# Patient Record
Sex: FEMALE | Race: WHITE | Employment: FULL TIME | URBAN - METROPOLITAN AREA
[De-identification: names, ages, dates, MRNs, and addresses within clinical notes are randomized per-mention and may not be internally consistent; named-entity substitution may affect disease eponyms.]

---

## 2017-07-17 ENCOUNTER — TELEPHONE (OUTPATIENT)
Dept: FAMILY MEDICINE CLINIC | Age: 47
End: 2017-07-17

## 2017-09-08 ENCOUNTER — NURSE ONLY (OUTPATIENT)
Dept: FAMILY MEDICINE CLINIC | Age: 47
End: 2017-09-08

## 2017-09-08 DIAGNOSIS — E55.9 VITAMIN D DEFICIENCY: Primary | ICD-10-CM

## 2017-09-08 DIAGNOSIS — Z00.00 ROUTINE GENERAL MEDICAL EXAMINATION AT A HEALTH CARE FACILITY: ICD-10-CM

## 2017-09-08 LAB
A/G RATIO: 1.9 (ref 1.1–2.2)
ALBUMIN SERPL-MCNC: 4.4 G/DL (ref 3.4–5)
ALP BLD-CCNC: 71 U/L (ref 40–129)
ALT SERPL-CCNC: 25 U/L (ref 10–40)
ANION GAP SERPL CALCULATED.3IONS-SCNC: 11 MMOL/L (ref 3–16)
AST SERPL-CCNC: 32 U/L (ref 15–37)
BILIRUB SERPL-MCNC: 0.4 MG/DL (ref 0–1)
BUN BLDV-MCNC: 21 MG/DL (ref 7–20)
CALCIUM SERPL-MCNC: 9.4 MG/DL (ref 8.3–10.6)
CHLORIDE BLD-SCNC: 105 MMOL/L (ref 99–110)
CHOLESTEROL, TOTAL: 157 MG/DL (ref 0–199)
CO2: 28 MMOL/L (ref 21–32)
CREAT SERPL-MCNC: 0.6 MG/DL (ref 0.6–1.1)
GFR AFRICAN AMERICAN: >60
GFR NON-AFRICAN AMERICAN: >60
GLOBULIN: 2.3 G/DL
GLUCOSE BLD-MCNC: 80 MG/DL (ref 70–99)
HCT VFR BLD CALC: 41.5 % (ref 36–48)
HDLC SERPL-MCNC: 57 MG/DL (ref 40–60)
HEMOGLOBIN: 13.8 G/DL (ref 12–16)
LDL CHOLESTEROL CALCULATED: 90 MG/DL
MCH RBC QN AUTO: 31.6 PG (ref 26–34)
MCHC RBC AUTO-ENTMCNC: 33.2 G/DL (ref 31–36)
MCV RBC AUTO: 95.2 FL (ref 80–100)
PDW BLD-RTO: 13.5 % (ref 12.4–15.4)
PLATELET # BLD: 135 K/UL (ref 135–450)
PMV BLD AUTO: 10.7 FL (ref 5–10.5)
POTASSIUM SERPL-SCNC: 4.7 MMOL/L (ref 3.5–5.1)
RBC # BLD: 4.36 M/UL (ref 4–5.2)
SODIUM BLD-SCNC: 144 MMOL/L (ref 136–145)
TOTAL PROTEIN: 6.7 G/DL (ref 6.4–8.2)
TRIGL SERPL-MCNC: 48 MG/DL (ref 0–150)
TSH SERPL DL<=0.05 MIU/L-ACNC: 0.81 UIU/ML (ref 0.27–4.2)
VITAMIN D 25-HYDROXY: 37.2 NG/ML
VLDLC SERPL CALC-MCNC: 10 MG/DL
WBC # BLD: 2.4 K/UL (ref 4–11)

## 2017-09-08 PROCEDURE — 36415 COLL VENOUS BLD VENIPUNCTURE: CPT | Performed by: FAMILY MEDICINE

## 2017-09-15 ENCOUNTER — OFFICE VISIT (OUTPATIENT)
Dept: FAMILY MEDICINE CLINIC | Age: 47
End: 2017-09-15

## 2017-09-15 VITALS
HEIGHT: 67 IN | BODY MASS INDEX: 18.21 KG/M2 | WEIGHT: 116 LBS | SYSTOLIC BLOOD PRESSURE: 90 MMHG | DIASTOLIC BLOOD PRESSURE: 56 MMHG

## 2017-09-15 DIAGNOSIS — Z00.00 WELL ADULT EXAM: Primary | ICD-10-CM

## 2017-09-15 PROCEDURE — 99396 PREV VISIT EST AGE 40-64: CPT | Performed by: FAMILY MEDICINE

## 2017-09-15 ASSESSMENT — ENCOUNTER SYMPTOMS
GASTROINTESTINAL NEGATIVE: 1
RESPIRATORY NEGATIVE: 1

## 2017-09-15 ASSESSMENT — PATIENT HEALTH QUESTIONNAIRE - PHQ9
SUM OF ALL RESPONSES TO PHQ9 QUESTIONS 1 & 2: 0
1. LITTLE INTEREST OR PLEASURE IN DOING THINGS: 0
2. FEELING DOWN, DEPRESSED OR HOPELESS: 0
SUM OF ALL RESPONSES TO PHQ QUESTIONS 1-9: 0

## 2018-03-16 ENCOUNTER — TELEPHONE (OUTPATIENT)
Dept: FAMILY MEDICINE CLINIC | Age: 48
End: 2018-03-16

## 2018-03-16 DIAGNOSIS — R79.89 ABNORMAL CBC: Primary | ICD-10-CM

## 2018-03-19 ENCOUNTER — NURSE ONLY (OUTPATIENT)
Dept: FAMILY MEDICINE CLINIC | Age: 48
End: 2018-03-19

## 2018-03-19 DIAGNOSIS — R79.89 ABNORMAL CBC: ICD-10-CM

## 2018-03-19 LAB
BASOPHILS ABSOLUTE: 0.1 K/UL (ref 0–0.2)
BASOPHILS RELATIVE PERCENT: 2.6 %
EOSINOPHILS ABSOLUTE: 0.2 K/UL (ref 0–0.6)
EOSINOPHILS RELATIVE PERCENT: 8.5 %
HCT VFR BLD CALC: 41.3 % (ref 36–48)
HEMOGLOBIN: 13.8 G/DL (ref 12–16)
LYMPHOCYTES ABSOLUTE: 0.8 K/UL (ref 1–5.1)
LYMPHOCYTES RELATIVE PERCENT: 33.2 %
MCH RBC QN AUTO: 31.5 PG (ref 26–34)
MCHC RBC AUTO-ENTMCNC: 33.4 G/DL (ref 31–36)
MCV RBC AUTO: 94.4 FL (ref 80–100)
MONOCYTES ABSOLUTE: 0.2 K/UL (ref 0–1.3)
MONOCYTES RELATIVE PERCENT: 8.3 %
NEUTROPHILS ABSOLUTE: 1.1 K/UL (ref 1.7–7.7)
NEUTROPHILS RELATIVE PERCENT: 47.4 %
PDW BLD-RTO: 13.4 % (ref 12.4–15.4)
PLATELET # BLD: 133 K/UL (ref 135–450)
PMV BLD AUTO: 10.4 FL (ref 5–10.5)
RBC # BLD: 4.38 M/UL (ref 4–5.2)
WBC # BLD: 2.3 K/UL (ref 4–11)

## 2018-03-19 PROCEDURE — 36415 COLL VENOUS BLD VENIPUNCTURE: CPT | Performed by: FAMILY MEDICINE

## 2018-03-21 LAB
ALBUMIN SERPL-MCNC: 3.8 G/DL (ref 3.1–4.9)
ALPHA-1-GLOBULIN: 0.2 G/DL (ref 0.2–0.4)
ALPHA-2-GLOBULIN: 0.6 G/DL (ref 0.4–1.1)
BETA GLOBULIN: 1 G/DL (ref 0.9–1.6)
GAMMA GLOBULIN: 1 G/DL (ref 0.6–1.8)
SPE/IFE INTERPRETATION: NORMAL
TOTAL PROTEIN: 6.5 G/DL (ref 6.4–8.2)

## 2018-08-24 ENCOUNTER — TELEPHONE (OUTPATIENT)
Dept: FAMILY MEDICINE CLINIC | Age: 48
End: 2018-08-24

## 2018-09-07 PROCEDURE — 90686 IIV4 VACC NO PRSV 0.5 ML IM: CPT | Performed by: FAMILY MEDICINE

## 2018-09-07 PROCEDURE — 90471 IMMUNIZATION ADMIN: CPT | Performed by: FAMILY MEDICINE

## 2018-09-13 ENCOUNTER — NURSE ONLY (OUTPATIENT)
Dept: FAMILY MEDICINE CLINIC | Age: 48
End: 2018-09-13

## 2018-09-13 DIAGNOSIS — Z00.00 ANNUAL PHYSICAL EXAM: Primary | ICD-10-CM

## 2018-09-13 LAB
A/G RATIO: 2.3 (ref 1.1–2.2)
ALBUMIN SERPL-MCNC: 4.9 G/DL (ref 3.4–5)
ALP BLD-CCNC: 80 U/L (ref 40–129)
ALT SERPL-CCNC: 27 U/L (ref 10–40)
ANION GAP SERPL CALCULATED.3IONS-SCNC: 11 MMOL/L (ref 3–16)
AST SERPL-CCNC: 37 U/L (ref 15–37)
BILIRUB SERPL-MCNC: 0.4 MG/DL (ref 0–1)
BUN BLDV-MCNC: 16 MG/DL (ref 7–20)
CALCIUM SERPL-MCNC: 10 MG/DL (ref 8.3–10.6)
CHLORIDE BLD-SCNC: 106 MMOL/L (ref 99–110)
CHOLESTEROL, TOTAL: 162 MG/DL (ref 0–199)
CO2: 29 MMOL/L (ref 21–32)
CREAT SERPL-MCNC: 0.8 MG/DL (ref 0.6–1.1)
GFR AFRICAN AMERICAN: >60
GFR NON-AFRICAN AMERICAN: >60
GLOBULIN: 2.1 G/DL
GLUCOSE BLD-MCNC: 85 MG/DL (ref 70–99)
HCT VFR BLD CALC: 45.4 % (ref 36–48)
HDLC SERPL-MCNC: 54 MG/DL (ref 40–60)
HEMOGLOBIN: 15 G/DL (ref 12–16)
LDL CHOLESTEROL CALCULATED: 95 MG/DL
MCH RBC QN AUTO: 31 PG (ref 26–34)
MCHC RBC AUTO-ENTMCNC: 33.1 G/DL (ref 31–36)
MCV RBC AUTO: 93.7 FL (ref 80–100)
PDW BLD-RTO: 13.1 % (ref 12.4–15.4)
PLATELET # BLD: 147 K/UL (ref 135–450)
PMV BLD AUTO: 10.3 FL (ref 5–10.5)
POTASSIUM SERPL-SCNC: 5.4 MMOL/L (ref 3.5–5.1)
RBC # BLD: 4.84 M/UL (ref 4–5.2)
SODIUM BLD-SCNC: 146 MMOL/L (ref 136–145)
TOTAL PROTEIN: 7 G/DL (ref 6.4–8.2)
TRIGL SERPL-MCNC: 65 MG/DL (ref 0–150)
TSH REFLEX: 0.87 UIU/ML (ref 0.27–4.2)
VITAMIN D 25-HYDROXY: 37.6 NG/ML
VLDLC SERPL CALC-MCNC: 13 MG/DL
WBC # BLD: 2.6 K/UL (ref 4–11)

## 2018-09-13 PROCEDURE — 36415 COLL VENOUS BLD VENIPUNCTURE: CPT | Performed by: FAMILY MEDICINE

## 2018-09-17 ENCOUNTER — OFFICE VISIT (OUTPATIENT)
Dept: FAMILY MEDICINE CLINIC | Age: 48
End: 2018-09-17

## 2018-09-17 VITALS
HEIGHT: 67 IN | SYSTOLIC BLOOD PRESSURE: 94 MMHG | BODY MASS INDEX: 18.71 KG/M2 | WEIGHT: 119.2 LBS | DIASTOLIC BLOOD PRESSURE: 66 MMHG

## 2018-09-17 DIAGNOSIS — Z00.00 WELL ADULT EXAM: Primary | ICD-10-CM

## 2018-09-17 DIAGNOSIS — Z23 NEED FOR INFLUENZA VACCINATION: ICD-10-CM

## 2018-09-17 PROCEDURE — 99396 PREV VISIT EST AGE 40-64: CPT | Performed by: FAMILY MEDICINE

## 2018-09-17 ASSESSMENT — PATIENT HEALTH QUESTIONNAIRE - PHQ9
SUM OF ALL RESPONSES TO PHQ QUESTIONS 1-9: 0
SUM OF ALL RESPONSES TO PHQ QUESTIONS 1-9: 0
SUM OF ALL RESPONSES TO PHQ9 QUESTIONS 1 & 2: 0
2. FEELING DOWN, DEPRESSED OR HOPELESS: 0
1. LITTLE INTEREST OR PLEASURE IN DOING THINGS: 0

## 2018-09-17 ASSESSMENT — ENCOUNTER SYMPTOMS
RESPIRATORY NEGATIVE: 1
GASTROINTESTINAL NEGATIVE: 1

## 2018-09-17 NOTE — PROGRESS NOTES
normal mood and affect. Her behavior is normal. Judgment and thought content normal.   Nursing note and vitals reviewed. Assessment:   Assessment/plan;  Miranda Miller was seen today for annual exam.    Diagnoses and all orders for this visit:    Well adult exam    Need for influenza vaccination  -     INFLUENZA, QUADV, 3 YRS AND OLDER, IM, PF, PREFILL SYR OR SDV, 0.5ML (Sonja Conn, PF)      Return in about 1 year (around 9/17/2019), or if symptoms worsen or fail to improve.   Discussed health lifestyle   Forms completed     Rhoda Bauman DO

## 2018-11-08 ENCOUNTER — OFFICE VISIT (OUTPATIENT)
Dept: ORTHOPEDIC SURGERY | Age: 48
End: 2018-11-08
Payer: COMMERCIAL

## 2018-11-08 VITALS
BODY MASS INDEX: 18.75 KG/M2 | DIASTOLIC BLOOD PRESSURE: 64 MMHG | HEIGHT: 67 IN | WEIGHT: 119.49 LBS | SYSTOLIC BLOOD PRESSURE: 102 MMHG

## 2018-11-08 DIAGNOSIS — S76.319A HAMSTRING TEAR: Primary | ICD-10-CM

## 2018-11-08 PROCEDURE — 99214 OFFICE O/P EST MOD 30 MIN: CPT | Performed by: ORTHOPAEDIC SURGERY

## 2018-11-08 NOTE — PROGRESS NOTES
Examination  Inspection:  Inspection demonstrates no obvious deformity    Palpation:  She does have some palpable swelling and crepitus over her proximal ischial tuberosity attachment of the hamstring on the left    Rang of Motion:  Good range of motion she does have pain with flexibility of the hamstring tendon    Strength:  She has pain with strength testing of the hamstring but overall her lower extremity strength is excellent    Special Tests:  Pedal pulses are +2 over 4 capillary refill is brisk sensation is intact negative Homans negative Uday negative Wood's test she does have pain to palpation at the initial tuberosity she does a pain to palpation along the proximal hamstring tendon she has good strength I can palpate the tendon all the way to the initial tuberosity    Skin: There are no rashes, ulcerations or lesions. Gait: Normal gait  +2/4 and equal bilaterally for patella and Achilles      Additional Comments:     Additional Examinations:  Right Lower Extremity: Examination of the right lower extremity does not show any tenderness, deformity or injury. Range of motion is unremarkable. There is no gross instability. There are no rashes, ulcerations or lesions. Strength and tone are normal.  Thoracic Spine: Examination of the thoracic spine does not show any tenderness, deformity or injury. Range of motion is unremarkable. There is no gross instability. There are no rashes, ulcerations or lesions. Strength and tone are normal.  Lower Back: Examination of the lower back does not show any tenderness, deformity or injury. Range of motion is unremarkable. There is no gross instability. There are no rashes, ulcerations or lesions.   Strength and tone are normal.      Diagnostic Testing:    Views MRI multiple views  and I independently reviewed these films today in my office,   Body Part left hamstring Impression proximal hamstring partial tear more than 60% of the tendon is torn

## 2018-11-12 ENCOUNTER — OFFICE VISIT (OUTPATIENT)
Dept: ORTHOPEDIC SURGERY | Age: 48
End: 2018-11-12
Payer: COMMERCIAL

## 2018-11-12 VITALS — BODY MASS INDEX: 18.68 KG/M2 | HEIGHT: 67 IN | WEIGHT: 119 LBS

## 2018-11-12 DIAGNOSIS — S76.312A LEFT PROXIMAL HAMSTRING TENDON RUPTURE, INITIAL ENCOUNTER: ICD-10-CM

## 2018-11-12 DIAGNOSIS — S76.319A PARTIAL HAMSTRING TEAR, INITIAL ENCOUNTER: Primary | ICD-10-CM

## 2018-11-12 PROCEDURE — 99243 OFF/OP CNSLTJ NEW/EST LOW 30: CPT | Performed by: INTERNAL MEDICINE

## 2018-11-12 NOTE — PROGRESS NOTES
Chief Complaint:   Chief Complaint   Patient presents with    Leg Pain     OPSP L HAMSTRING PAIN          History of Present Illness:       Patient is a 53 y.o. female presents with the above complaint. The symptoms began 6 monthsago and started without an injury. She is seen in consultation at the request of Dr. Renetta Molina for consideration biologic orthopedic injection recently diagnosed with partial tear/high-grade tear of common hamstring origin. The patient describes a sharp pain that does radiate. The symptoms are constant  and are show no change since the onset. Despite a trial of aggressive physical therapy inclusive of dry needling treatments there's been no appreciable change and with a more aggressive treatment of dry needling treatment she noted worsening pattern of pain she rates the pain is 9/10 in severity at worst.  There was no history of acute injury to either left or right hamstring. She is a recreational runner    She has a component of pain at rest in a seated position primarily but she can tolerate walking activity at a short puneet essentially pain-free. She would like to return to recreational running with her dog. Her workup to date has included MRI scan suggesting 90% disruption of the left and less so right hamstring tendon origins estimated up to 90% of tendon disruption relative to MRI outside institution October 15, 2018. MRI is not available for direct review at the time of this dictation.   She denies any low back pain she denies any neuritic character symptoms she is otherwise healthy and has no complaints she denies any new onset or progressive weakness of lower extremity or dynamic weakness of the lower extremity    She is not interested in seeking a surgical option for treatment at this time      She localizes pain to the skilled tuberosity region she denies any mechanical symptoms       Past Medical History:        Past Medical History:   Diagnosis Date   

## 2018-11-15 ENCOUNTER — TELEPHONE (OUTPATIENT)
Dept: ORTHOPEDIC SURGERY | Age: 48
End: 2018-11-15

## 2018-11-29 ENCOUNTER — OFFICE VISIT (OUTPATIENT)
Dept: ORTHOPEDIC SURGERY | Age: 48
End: 2018-11-29
Payer: COMMERCIAL

## 2018-11-29 VITALS — WEIGHT: 119 LBS | HEIGHT: 67 IN | BODY MASS INDEX: 18.68 KG/M2

## 2018-11-29 DIAGNOSIS — S76.312A LEFT PROXIMAL HAMSTRING TENDON RUPTURE, INITIAL ENCOUNTER: ICD-10-CM

## 2018-11-29 DIAGNOSIS — S76.319A PARTIAL HAMSTRING TEAR, INITIAL ENCOUNTER: Primary | ICD-10-CM

## 2018-11-29 PROCEDURE — PRPINJ PRP INJECTION: Performed by: INTERNAL MEDICINE

## 2018-11-29 PROCEDURE — 99999 PR OFFICE/OUTPT VISIT,PROCEDURE ONLY: CPT | Performed by: INTERNAL MEDICINE

## 2018-11-29 PROCEDURE — E0114 CRUTCH UNDERARM PAIR NO WOOD: HCPCS | Performed by: INTERNAL MEDICINE

## 2018-11-29 RX ORDER — CYCLOBENZAPRINE HCL 10 MG
10 TABLET ORAL NIGHTLY PRN
Qty: 10 TABLET | Refills: 1 | Status: SHIPPED | OUTPATIENT
Start: 2018-11-29 | End: 2018-12-09

## 2018-11-29 RX ORDER — TRAMADOL HYDROCHLORIDE 50 MG/1
50 TABLET ORAL EVERY 6 HOURS PRN
Qty: 20 TABLET | Refills: 0 | Status: SHIPPED | OUTPATIENT
Start: 2018-11-29 | End: 2018-12-09

## 2018-12-06 ENCOUNTER — OFFICE VISIT (OUTPATIENT)
Dept: ORTHOPEDIC SURGERY | Age: 48
End: 2018-12-06
Payer: COMMERCIAL

## 2018-12-06 VITALS — BODY MASS INDEX: 18.68 KG/M2 | HEIGHT: 67 IN | WEIGHT: 119 LBS

## 2018-12-06 DIAGNOSIS — S76.312D LEFT PROXIMAL HAMSTRING TENDON RUPTURE, SUBSEQUENT ENCOUNTER: ICD-10-CM

## 2018-12-06 DIAGNOSIS — S76.319D: Primary | ICD-10-CM

## 2018-12-06 PROCEDURE — 99213 OFFICE O/P EST LOW 20 MIN: CPT | Performed by: INTERNAL MEDICINE

## 2018-12-07 PROBLEM — S76.312D: Status: ACTIVE | Noted: 2018-11-12

## 2018-12-07 NOTE — PROGRESS NOTES
Amoxicillin     Penicillins            Review of Systems:    Pertinent items are noted in HPI       Vital Signs: There were no vitals filed for this visit. General Exam:     Constitutional: Patient is adequately groomed with no evidence of malnutrition    Physical Exam: left hip/thigh       Primary Exam:    Inspection:  No deformity atrophy or appreciable effusion      Palpation:  No focal area of tenderness      Range of Motion:  90/90-modified passive hamstring stretch without pain      Strength:  Submaximal resisted knee flexion without pain      Special Test: negative SLR      Skin: There are no rashes, ulcerations or lesions. GaitNear normal    Neurovascular - non focal and intact       Additional Comments:        Additional Examinations:                    Office Imaging Results/Procedures PerformedToday:          Office Procedures:     Orders Placed This Encounter   Procedures    Amb External Referral To Physical Therapy     Referral Priority:   Routine     Referral Type:   Consult for Advice and Opinion     Requested Specialty:   Physical Therapy     Number of Visits Requested:   1           Other Outside Imaging and Testing Personally Reviewed:       none          Assessment   Impression: . Encounter Diagnoses   Name Primary?     Partial hamstring tear, subsequent encounter Yes    Left proximal hamstring tendon rupture, subsequent encounter               Plan:     She can transition from crutch to when necessary use  Active rest avoidance of impact activities/dedicated recreational walking activity  She can start in modified hamstring rehabilitation program specifics documented in the medical record  WOMAC on follow up       Orders:        Orders Placed This Encounter   Procedures    Amb External Referral To Physical Therapy     Referral Priority:   Routine     Referral Type:   Consult for Advice and Opinion     Requested Specialty:   Physical Therapy     Number of Visits Requested:   1

## 2019-01-03 ENCOUNTER — OFFICE VISIT (OUTPATIENT)
Dept: ORTHOPEDIC SURGERY | Age: 49
End: 2019-01-03
Payer: COMMERCIAL

## 2019-01-03 VITALS — WEIGHT: 119 LBS | HEIGHT: 67 IN | BODY MASS INDEX: 18.68 KG/M2

## 2019-01-03 DIAGNOSIS — S76.312D LEFT PROXIMAL HAMSTRING TENDON RUPTURE, SUBSEQUENT ENCOUNTER: Primary | ICD-10-CM

## 2019-01-03 DIAGNOSIS — S76.319D: ICD-10-CM

## 2019-01-03 PROCEDURE — 99213 OFFICE O/P EST LOW 20 MIN: CPT | Performed by: INTERNAL MEDICINE

## 2019-03-07 ENCOUNTER — OFFICE VISIT (OUTPATIENT)
Dept: ORTHOPEDIC SURGERY | Age: 49
End: 2019-03-07
Payer: COMMERCIAL

## 2019-03-07 DIAGNOSIS — S76.319D: ICD-10-CM

## 2019-03-07 DIAGNOSIS — S76.312D LEFT PROXIMAL HAMSTRING TENDON RUPTURE, SUBSEQUENT ENCOUNTER: Primary | ICD-10-CM

## 2019-03-07 PROCEDURE — 99213 OFFICE O/P EST LOW 20 MIN: CPT | Performed by: INTERNAL MEDICINE

## 2019-06-06 ENCOUNTER — OFFICE VISIT (OUTPATIENT)
Dept: ORTHOPEDIC SURGERY | Age: 49
End: 2019-06-06
Payer: COMMERCIAL

## 2019-06-06 DIAGNOSIS — S76.312D LEFT PROXIMAL HAMSTRING TENDON RUPTURE, SUBSEQUENT ENCOUNTER: Primary | ICD-10-CM

## 2019-06-06 PROCEDURE — 99213 OFFICE O/P EST LOW 20 MIN: CPT | Performed by: INTERNAL MEDICINE

## 2019-06-06 NOTE — LETTER
[facility]  [place] [address] [address]  Phone: [phone]  Fax: [phone]    Rudolph Dancer, MD        June 6, 2019     Patient: Socorro Truong   YOB: 1970   Date of Visit: 6/6/2019       To Whom it May Concern:    Socorro Truong was seen in my clinic on 6/6/2019. If you have any questions or concerns, please don't hesitate to call.     Sincerely,         Rudolph Dancer, MD

## 2019-06-12 NOTE — PROGRESS NOTES
Chief Complaint:   Chief Complaint   Patient presents with    Leg Pain     F/U L HAMSTRING PAIN          History of Present Illness:       Patient is a 53 y.o. female returns follow up for the above complaint. The patient was last seen approximately 3 monthsago. The symptoms are improving since the last visit. The patient has had no further testing for the problem. She is increasingly more functional and has returned to running up to 4.5 miles per session and she is tolerating this without escalating pain levels she is pleased with the improvement and has transition from physical therapy    She is now 6 months out from type I PRP injection ultrasound-guided proximal hamstring origin    WOMAC 1    She denies any neuritic symptoms involving the lower extremities she denies any new onset progressive weakness of the lower extremities    No mechanical symptoms localizing to the ischial tuberosity region       Past Medical History:        Past Medical History:   Diagnosis Date    Allergic     Staph skin infection Oct 2012    knee        Present Medications:         Current Outpatient Medications   Medication Sig Dispense Refill    Magnesium Hydroxide (MAGNESIA PO) Take by mouth      Misc Natural Products (TART CHERRY ADVANCED PO) Take 1,000 mg by mouth daily      GLUCOSAMINE HCL-MSM PO Take by mouth      TURMERIC CURCUMIN PO Take by mouth      Cholecalciferol (VITAMIN D3) 400 UNIT/ML LIQD Take by mouth      IRON, FERROUS GLUCONATE, PO Take by mouth       Current Facility-Administered Medications   Medication Dose Route Frequency Provider Last Rate Last Dose    methylPREDNISolone acetate (DEPO-MEDROL) injection 80 mg  80 mg Intra-articular Once Navneet Bradley DO             Allergies: Allergies   Allergen Reactions    Amoxicillin     Penicillins            Review of Systems:    Pertinent items are noted in HPI        Vital Signs: There were no vitals filed for this visit.      General Exam: Constitutional: Patient is adequately groomed with no evidence of malnutrition    Physical Exam: left hip/thigh      Primary Exam:    Inspection: No deformity atrophy appreciable defect      Palpation: No focal tenderness      Range of Motion: 90/90 position excellent hamstring flexibility no pain with passive hip flexion      Strength: Submaximal resisted knee flexion without pain      Special Tests: Negative SLR      Skin: There are no rashes, ulcerations or lesions. Gait: Nonantalgic      Neurovascular - non focal and intact       Additional Comments:        Additional Examinations:                   Office Imaging Results/Procedures PerformedToday:     Limited ultrasound evaluation left hip/thigh  Left proximal hamstring  Logically ultrasound 10 MHz    The linear transducer was utilized and image optimization was obtained. The proximal hamstring tendon was evaluated extensively in long axis using linear transducer. Again noted was the full-thickness partial tear of the common hamstring origin with intact medial fibers clearly originating from the ischial tuberosity. Overall there is suggestion of increase in echogenicity consistent with reparative change in the intact distribution of the proximal hamstring tendon but overall increased caliber of the tendon consistent with tendinopathy/strain. There was a anechoic fluid signal within the defect resulting from the torn portion and a thin anechoic fluid signal overlying the intact portion of the tendon. Dynamic stressing there was no evidence of gapping of fibers to suggest high-grade tear in the portion that remained intact.     No soft tissue lesions or cystic lesions in the anatomic area that was evaluated    Power Doppler imaging negative     Office Procedures:     Orders Placed This Encounter   Procedures    US EXTREMITY RIGHT NON VASC LIMITED           Other Outside Imaging and Testing Personally Reviewed:    Us Extremity Right Non Vasc

## 2019-09-11 ENCOUNTER — NURSE ONLY (OUTPATIENT)
Dept: FAMILY MEDICINE CLINIC | Age: 49
End: 2019-09-11
Payer: COMMERCIAL

## 2019-09-11 ENCOUNTER — TELEPHONE (OUTPATIENT)
Dept: FAMILY MEDICINE CLINIC | Age: 49
End: 2019-09-11

## 2019-09-11 DIAGNOSIS — Z00.00 WELL ADULT EXAM: Primary | ICD-10-CM

## 2019-09-11 LAB
A/G RATIO: 2.6 (ref 1.1–2.2)
ALBUMIN SERPL-MCNC: 4.6 G/DL (ref 3.4–5)
ALP BLD-CCNC: 60 U/L (ref 40–129)
ALT SERPL-CCNC: 33 U/L (ref 10–40)
ANION GAP SERPL CALCULATED.3IONS-SCNC: 10 MMOL/L (ref 3–16)
AST SERPL-CCNC: 44 U/L (ref 15–37)
BASOPHILS ABSOLUTE: 0 K/UL (ref 0–0.2)
BASOPHILS RELATIVE PERCENT: 1 %
BILIRUB SERPL-MCNC: 0.4 MG/DL (ref 0–1)
BUN BLDV-MCNC: 19 MG/DL (ref 7–20)
CALCIUM SERPL-MCNC: 9.5 MG/DL (ref 8.3–10.6)
CHLORIDE BLD-SCNC: 106 MMOL/L (ref 99–110)
CHOLESTEROL, TOTAL: 153 MG/DL (ref 0–199)
CO2: 27 MMOL/L (ref 21–32)
CREAT SERPL-MCNC: 0.8 MG/DL (ref 0.6–1.1)
CRENATED RBC'S: ABNORMAL
EOSINOPHILS ABSOLUTE: 0.1 K/UL (ref 0–0.6)
EOSINOPHILS RELATIVE PERCENT: 3 %
GFR AFRICAN AMERICAN: >60
GFR NON-AFRICAN AMERICAN: >60
GLOBULIN: 1.8 G/DL
GLUCOSE BLD-MCNC: 90 MG/DL (ref 70–99)
HCT VFR BLD CALC: 41 % (ref 36–48)
HDLC SERPL-MCNC: 58 MG/DL (ref 40–60)
HEMATOLOGY PATH CONSULT: YES
HEMOGLOBIN: 13.8 G/DL (ref 12–16)
LDL CHOLESTEROL CALCULATED: 83 MG/DL
LYMPHOCYTES ABSOLUTE: 0.7 K/UL (ref 1–5.1)
LYMPHOCYTES RELATIVE PERCENT: 36 %
MCH RBC QN AUTO: 32.1 PG (ref 26–34)
MCHC RBC AUTO-ENTMCNC: 33.5 G/DL (ref 31–36)
MCV RBC AUTO: 95.8 FL (ref 80–100)
MONOCYTES ABSOLUTE: 0.1 K/UL (ref 0–1.3)
MONOCYTES RELATIVE PERCENT: 6 %
NEUTROPHILS ABSOLUTE: 1 K/UL (ref 1.7–7.7)
NEUTROPHILS RELATIVE PERCENT: 54 %
PDW BLD-RTO: 13.9 % (ref 12.4–15.4)
PLATELET # BLD: 129 K/UL (ref 135–450)
PLATELET SLIDE REVIEW: ABNORMAL
PMV BLD AUTO: 10.2 FL (ref 5–10.5)
POIKILOCYTES: ABNORMAL
POTASSIUM SERPL-SCNC: 5 MMOL/L (ref 3.5–5.1)
RBC # BLD: 4.28 M/UL (ref 4–5.2)
SLIDE REVIEW: ABNORMAL
SODIUM BLD-SCNC: 143 MMOL/L (ref 136–145)
TOTAL PROTEIN: 6.4 G/DL (ref 6.4–8.2)
TRIGL SERPL-MCNC: 62 MG/DL (ref 0–150)
TSH SERPL DL<=0.05 MIU/L-ACNC: 1.17 UIU/ML (ref 0.27–4.2)
VITAMIN D 25-HYDROXY: 30.1 NG/ML
VLDLC SERPL CALC-MCNC: 12 MG/DL
WBC # BLD: 1.9 K/UL (ref 4–11)

## 2019-09-11 PROCEDURE — 36415 COLL VENOUS BLD VENIPUNCTURE: CPT | Performed by: FAMILY MEDICINE

## 2019-09-12 LAB — HEMATOLOGY PATH CONSULT: NORMAL

## 2019-09-18 ENCOUNTER — HOSPITAL ENCOUNTER (OUTPATIENT)
Dept: ULTRASOUND IMAGING | Age: 49
Discharge: HOME | End: 2019-09-18
Payer: COMMERCIAL

## 2019-09-18 ENCOUNTER — OFFICE VISIT (OUTPATIENT)
Dept: FAMILY MEDICINE CLINIC | Age: 49
End: 2019-09-18
Payer: COMMERCIAL

## 2019-09-18 VITALS
HEIGHT: 67 IN | DIASTOLIC BLOOD PRESSURE: 64 MMHG | SYSTOLIC BLOOD PRESSURE: 118 MMHG | WEIGHT: 118 LBS | BODY MASS INDEX: 18.52 KG/M2

## 2019-09-18 DIAGNOSIS — Z00.00 WELL ADULT EXAM: Primary | ICD-10-CM

## 2019-09-18 DIAGNOSIS — D72.819 LEUKOPENIA, UNSPECIFIED TYPE: ICD-10-CM

## 2019-09-18 PROCEDURE — 76705 ECHO EXAM OF ABDOMEN: CPT

## 2019-09-18 PROCEDURE — 99396 PREV VISIT EST AGE 40-64: CPT | Performed by: FAMILY MEDICINE

## 2019-09-18 ASSESSMENT — PATIENT HEALTH QUESTIONNAIRE - PHQ9
SUM OF ALL RESPONSES TO PHQ QUESTIONS 1-9: 0
SUM OF ALL RESPONSES TO PHQ9 QUESTIONS 1 & 2: 0
2. FEELING DOWN, DEPRESSED OR HOPELESS: 0
1. LITTLE INTEREST OR PLEASURE IN DOING THINGS: 0
SUM OF ALL RESPONSES TO PHQ QUESTIONS 1-9: 0

## 2019-12-17 ENCOUNTER — OFFICE VISIT (OUTPATIENT)
Dept: ORTHOPEDIC SURGERY | Age: 49
End: 2019-12-17
Payer: COMMERCIAL

## 2019-12-17 VITALS — RESPIRATION RATE: 17 BRPM | HEIGHT: 67 IN | BODY MASS INDEX: 18.51 KG/M2 | WEIGHT: 117.95 LBS

## 2019-12-17 DIAGNOSIS — S76.319D: ICD-10-CM

## 2019-12-17 DIAGNOSIS — S76.312D LEFT PROXIMAL HAMSTRING TENDON RUPTURE, SUBSEQUENT ENCOUNTER: Primary | ICD-10-CM

## 2019-12-17 PROCEDURE — 99213 OFFICE O/P EST LOW 20 MIN: CPT | Performed by: INTERNAL MEDICINE

## 2020-08-10 ENCOUNTER — TELEPHONE (OUTPATIENT)
Dept: GYNECOLOGY | Age: 50
End: 2020-08-10

## 2020-08-10 NOTE — TELEPHONE ENCOUNTER
Patient called to schedule Wellness visit for 9/18, she would like to have blood work done, I told her to go to [facility] to have Blood work done before hand for her Wellness Visit, so please put order in so she may do this  Before her visit, thanks

## 2020-09-11 DIAGNOSIS — Z00.00 WELL ADULT EXAM: ICD-10-CM

## 2020-09-11 LAB
A/G RATIO: 2.3 (ref 1.1–2.2)
ALBUMIN SERPL-MCNC: 4.5 G/DL (ref 3.4–5)
ALP BLD-CCNC: 59 U/L (ref 40–129)
ALT SERPL-CCNC: 24 U/L (ref 10–40)
ANION GAP SERPL CALCULATED.3IONS-SCNC: 11 MMOL/L (ref 3–16)
AST SERPL-CCNC: 33 U/L (ref 15–37)
BILIRUB SERPL-MCNC: 0.4 MG/DL (ref 0–1)
BUN BLDV-MCNC: 21 MG/DL (ref 7–20)
CALCIUM SERPL-MCNC: 9.7 MG/DL (ref 8.3–10.6)
CHLORIDE BLD-SCNC: 107 MMOL/L (ref 99–110)
CHOLESTEROL, TOTAL: 194 MG/DL (ref 0–199)
CO2: 25 MMOL/L (ref 21–32)
CREAT SERPL-MCNC: 0.6 MG/DL (ref 0.6–1.1)
GFR AFRICAN AMERICAN: >60
GFR NON-AFRICAN AMERICAN: >60
GLOBULIN: 2 G/DL
GLUCOSE BLD-MCNC: 88 MG/DL (ref 70–99)
HCT VFR BLD CALC: 42.7 % (ref 36–48)
HDLC SERPL-MCNC: 58 MG/DL (ref 40–60)
HEMOGLOBIN: 14.4 G/DL (ref 12–16)
LDL CHOLESTEROL CALCULATED: 123 MG/DL
MCH RBC QN AUTO: 31.7 PG (ref 26–34)
MCHC RBC AUTO-ENTMCNC: 33.7 G/DL (ref 31–36)
MCV RBC AUTO: 94 FL (ref 80–100)
PDW BLD-RTO: 12.8 % (ref 12.4–15.4)
PLATELET # BLD: 130 K/UL (ref 135–450)
PMV BLD AUTO: 9.9 FL (ref 5–10.5)
POTASSIUM SERPL-SCNC: 4.1 MMOL/L (ref 3.5–5.1)
RBC # BLD: 4.54 M/UL (ref 4–5.2)
SODIUM BLD-SCNC: 143 MMOL/L (ref 136–145)
TOTAL PROTEIN: 6.5 G/DL (ref 6.4–8.2)
TRIGL SERPL-MCNC: 63 MG/DL (ref 0–150)
TSH SERPL DL<=0.05 MIU/L-ACNC: 1.15 UIU/ML (ref 0.27–4.2)
VLDLC SERPL CALC-MCNC: 13 MG/DL
WBC # BLD: 2.6 K/UL (ref 4–11)

## 2020-09-18 ENCOUNTER — OFFICE VISIT (OUTPATIENT)
Dept: FAMILY MEDICINE CLINIC | Age: 50
End: 2020-09-18
Payer: COMMERCIAL

## 2020-09-18 VITALS
BODY MASS INDEX: 18.36 KG/M2 | HEIGHT: 67 IN | DIASTOLIC BLOOD PRESSURE: 62 MMHG | SYSTOLIC BLOOD PRESSURE: 90 MMHG | TEMPERATURE: 97.8 F | WEIGHT: 117 LBS

## 2020-09-18 PROCEDURE — 99396 PREV VISIT EST AGE 40-64: CPT | Performed by: FAMILY MEDICINE

## 2020-09-18 RX ORDER — ACETAMINOPHEN 160 MG
2 TABLET,DISINTEGRATING ORAL DAILY
COMMUNITY

## 2020-09-18 ASSESSMENT — PATIENT HEALTH QUESTIONNAIRE - PHQ9
1. LITTLE INTEREST OR PLEASURE IN DOING THINGS: 0
SUM OF ALL RESPONSES TO PHQ QUESTIONS 1-9: 0
SUM OF ALL RESPONSES TO PHQ QUESTIONS 1-9: 0
2. FEELING DOWN, DEPRESSED OR HOPELESS: 0
SUM OF ALL RESPONSES TO PHQ9 QUESTIONS 1 & 2: 0

## 2020-09-18 NOTE — PROGRESS NOTES
Subjective:      Patient ID: Altaf lEliott is a 51 y.o. female. HPI  Well exam  She is doing fine  No concerns  She had blood work  She wants to review   Review of Systems   Constitutional: Negative. Respiratory: Negative. Cardiovascular: Negative. Gastrointestinal: Negative. Skin: Negative. Neurological: Negative. YOB: 1970    Date of Visit:  9/18/2020    Allergies   Allergen Reactions    Amoxicillin     Penicillins        Outpatient Medications Marked as Taking for the 9/18/20 encounter (Office Visit) with Lele Kerns DO   Medication Sig Dispense Refill    Cholecalciferol (VITAMIN D3) 50 MCG (2000 UT) CAPS Take by mouth      Magnesium Hydroxide (MAGNESIA PO) Take by mouth      IRON, FERROUS GLUCONATE, PO Take by mouth         Vitals:    09/18/20 0853   BP: 90/62   Temp: 97.8 °F (36.6 °C)   Weight: 117 lb (53.1 kg)   Height: 5' 7\" (1.702 m)     Body mass index is 18.32 kg/m².      Wt Readings from Last 3 Encounters:   09/18/20 117 lb (53.1 kg)   12/17/19 117 lb 15.1 oz (53.5 kg)   09/18/19 118 lb (53.5 kg)     BP Readings from Last 3 Encounters:   09/18/20 90/62   09/18/19 118/64   11/08/18 102/64     Allergies   Allergen Reactions    Amoxicillin     Penicillins      Current Outpatient Medications   Medication Sig Dispense Refill    Cholecalciferol (VITAMIN D3) 50 MCG (2000 UT) CAPS Take by mouth      Magnesium Hydroxide (MAGNESIA PO) Take by mouth      IRON, FERROUS GLUCONATE, PO Take by mouth       Current Facility-Administered Medications   Medication Dose Route Frequency Provider Last Rate Last Dose    methylPREDNISolone acetate (DEPO-MEDROL) injection 80 mg  80 mg Intra-articular Once Lele Kerns DO         Past Medical History:   Diagnosis Date    Allergic     Neuroma     Staph skin infection Oct 2012    knee     Past Surgical History:   Procedure Laterality Date    FINGER SURGERY  2012    remove infection    FOOT SURGERY      seasmoidectomy Social History     Tobacco Use    Smoking status: Never Smoker    Smokeless tobacco: Never Used    Tobacco comment: encouraged to never smoke    Substance Use Topics    Alcohol use: Yes     Alcohol/week: 0.0 standard drinks     Comment: 1-2 per week    Drug use: No     Family History   Problem Relation Age of Onset    Parkinsonism Mother     High Cholesterol Mother     Glaucoma Father     High Cholesterol Father     Glaucoma Brother         four brothers with glaucoma          Objective:   Physical Exam  Vitals signs and nursing note reviewed. Constitutional:       General: She is not in acute distress. Appearance: She is well-developed. HENT:      Head: Normocephalic. Right Ear: External ear normal.      Left Ear: External ear normal.      Nose: Nose normal.   Eyes:      General:         Left eye: No discharge. Conjunctiva/sclera: Conjunctivae normal.      Pupils: Pupils are equal, round, and reactive to light. Neck:      Musculoskeletal: Normal range of motion. Thyroid: No thyromegaly. Cardiovascular:      Rate and Rhythm: Normal rate and regular rhythm. Heart sounds: Normal heart sounds. No murmur. Pulmonary:      Effort: Pulmonary effort is normal. No respiratory distress. Breath sounds: No wheezing or rales. Abdominal:      General: There is no distension. Palpations: Abdomen is soft. There is no mass. Tenderness: There is no guarding. Lymphadenopathy:      Cervical: No cervical adenopathy. Skin:     General: Skin is warm and dry. Findings: No erythema or rash. Neurological:      Mental Status: She is alert and oriented to person, place, and time. Deep Tendon Reflexes: Reflexes are normal and symmetric. Psychiatric:         Behavior: Behavior normal.         Thought Content:  Thought content normal.         Judgment: Judgment normal.         Assessment:     Assessment/plan;  Va Ballesteros was seen today for annual exam and results.     Diagnoses and all orders for this visit:    Well adult exam      Discussed lab     Health maintenance and safety discussed     Pema Garcia DO

## 2020-09-19 ASSESSMENT — ENCOUNTER SYMPTOMS
GASTROINTESTINAL NEGATIVE: 1
RESPIRATORY NEGATIVE: 1

## 2021-06-08 ENCOUNTER — OFFICE VISIT (OUTPATIENT)
Dept: ORTHOPEDIC SURGERY | Age: 51
End: 2021-06-08
Payer: COMMERCIAL

## 2021-06-08 VITALS — HEIGHT: 67 IN | BODY MASS INDEX: 18.37 KG/M2 | WEIGHT: 117.06 LBS

## 2021-06-08 DIAGNOSIS — S46.212A RUPTURE OF LEFT DISTAL BICEPS TENDON, INITIAL ENCOUNTER: ICD-10-CM

## 2021-06-08 DIAGNOSIS — M25.522 LEFT ELBOW PAIN: Primary | ICD-10-CM

## 2021-06-08 DIAGNOSIS — S46.212A TRAUMATIC PARTIAL TEAR OF LEFT BICEPS TENDON, INITIAL ENCOUNTER: ICD-10-CM

## 2021-06-08 PROCEDURE — 99214 OFFICE O/P EST MOD 30 MIN: CPT | Performed by: INTERNAL MEDICINE

## 2021-06-08 RX ORDER — MELOXICAM 15 MG/1
15 TABLET ORAL DAILY
Qty: 30 TABLET | Refills: 1 | OUTPATIENT
Start: 2021-06-08 | End: 2021-08-29

## 2021-06-08 NOTE — PROGRESS NOTES
Chief Complaint:   Chief Complaint   Patient presents with    Elbow Pain     left, onset 5/18/21, NKI, felt pain during workout, \"overuse\" injury, pain did not go away even with changing/eliminating workouts          History of Present Illness:       Patient is a 53 y.o. female presents with the above complaint. The symptoms began 3 weeksago and started without an injury. The patient describes a sharp pain that does not radiate. The symptoms are constant  and are show no change since the onset. Pain localizes to the cubital fossa region of the elbow and  does not seem to follow a typical epicondylitis provacative pattern. There are not associated mechanical symptoms. There is not neuritic symptoms about the elbow. The patient denies subjective instability about the elbow and denies new onset or progressive weakness of the upper extremity. Pain level 6    The patient denies a pattern of activity related swelling. Treatment to date: Activity modifications    There is nono prior history of elbow trauma. There is no prior history of autoimmune disease, crystal arthropathy, or crystal arthropathy.           Past Medical History:        Past Medical History:   Diagnosis Date    Allergic     Neuroma     Staph skin infection Oct 2012    knee         Past Surgical History:   Procedure Laterality Date    FINGER SURGERY  2012    remove infection    FOOT SURGERY      seasmoidectomy         Present Medications:         Current Outpatient Medications   Medication Sig Dispense Refill    meloxicam (MOBIC) 15 MG tablet Take 1 tablet by mouth daily 30 tablet 1    Cholecalciferol (VITAMIN D3) 50 MCG (2000 UT) CAPS Take by mouth      Magnesium Hydroxide (MAGNESIA PO) Take by mouth      IRON, FERROUS GLUCONATE, PO Take by mouth       Current Facility-Administered Medications   Medication Dose Route Frequency Provider Last Rate Last Admin    methylPREDNISolone acetate (DEPO-MEDROL) injection 80 mg  80 mg visit. General Exam:     Constitutional: Patient is adequately groomed with no evidence of malnutrition  Mental Status: The patient is oriented to time, place and person. The patient's mood and affect are appropriate. Vascular: Examination reveals no swelling or calf tenderness. Peripheral pulses are palpable and 2+. Lymphatics: no lymphadenopathy of the inguinal region or lower extremity      Physical Exam: left elbow   General: Thin body habitus     Primary Exam:    Inspection: No deformity atrophy appreciable effusion      Palpation: There is tenderness of the distal biceps tendon at the level of the radial styloid      Range of Motion: Full range symmetric      Strength: Normal resisted pain with supination reproducing her pain      Special Tests: Negative hook test      Skin: There are no rashes, ulcerations or lesions. Gait: Nonantalgic      Reflex intact upper     Additional Comments:        Additional Examinations:           Right Upper Extremity:  Examination of the right upper extremity does not show any tenderness, deformity or injury. Range of motion is unremarkable. There is no gross instability. There are no rashes, ulcerations or lesions. Strength and tone are normal.   Neurologic -Light touch sensation and manual muscle testing is normal C5-C8 . Biceps and triceps reflexes are symmetric and +2.  Spurlling sign is negative         Office Imaging Results/Procedures PerformedToday:      Radiology:      X-rays obtained and reviewed in office:   Views 3 views   Location left elbow   Impression radial humeral ulnohumeral joints have normal radiographic appearance lateral projection reveals radiocapitellar joint is congruent       Office Procedures:     Orders Placed This Encounter   Procedures    XR ELBOW LEFT (MIN 3 VIEWS)     Standing Status:   Future     Number of Occurrences:   1     Standing Expiration Date:   6/8/2022     Order Specific Question:   Reason for exam:     Answer: pain    MRI ELBOW LEFT WO CONTRAST     [facility], please call pt to schedule, Kisha [address] will obtain auth and fwd to your facility  Pt advised to f/u in clinic 2-3 days after MRI for results     Standing Status:   Future     Standing Expiration Date:   6/8/2022     Order Specific Question:   Reason for exam:     Answer:   r/o biceps tendon partial insertion tear           Other Outside Imaging and Testing Personally Reviewed:    No results found. Assessment   Impression: . Encounter Diagnoses   Name Primary?  Rupture of left distal biceps tendon, initial encounter     Traumatic partial tear of left biceps tendon, initial encounter     Left elbow pain Yes        Biceps tendon insertion      Plan: Activity modification-distal biceps tendon protocol  Gentle stretching program demonstrated in the office  MRI evaluation evaluate for partial tear and/or high-grade tendinopathy distal biceps tendon         The nature of the finding, probable diagnosis and likely treatment was thoroughly discussed with the patient. The options, risks, complications, alternative treatment as well as some of the differential diagnosis was discussed. The patient was thoroughly informed and all questions were answered. the patient indicated understanding and satisfaction with the discussion.       Orders:        Orders Placed This Encounter   Procedures    XR ELBOW LEFT (MIN 3 VIEWS)     Standing Status:   Future     Number of Occurrences:   1     Standing Expiration Date:   6/8/2022     Order Specific Question:   Reason for exam:     Answer:   pain    MRI ELBOW LEFT WO CONTRAST     [facility], please call pt to schedule, Kisha [address] will obtain auth and fwd to your facility  Pt advised to f/u in clinic 2-3 days after MRI for results     Standing Status:   Future     Standing Expiration Date:   6/8/2022     Order Specific Question:   Reason for exam:     Answer:   r/o biceps tendon partial insertion tear           Disclaimer: \"This note was dictated with voice recognition software. Though review and correction are routine, we apologize for any errors. \"

## 2021-06-22 ENCOUNTER — OFFICE VISIT (OUTPATIENT)
Dept: ORTHOPEDIC SURGERY | Age: 51
End: 2021-06-22
Payer: COMMERCIAL

## 2021-06-22 VITALS — WEIGHT: 117.06 LBS | HEIGHT: 67 IN | BODY MASS INDEX: 18.37 KG/M2

## 2021-06-22 DIAGNOSIS — M77.8 TENDINITIS OF LEFT ELBOW: ICD-10-CM

## 2021-06-22 DIAGNOSIS — M75.22 BICEPS TENDINITIS, LEFT: ICD-10-CM

## 2021-06-22 PROCEDURE — 99214 OFFICE O/P EST MOD 30 MIN: CPT | Performed by: INTERNAL MEDICINE

## 2021-06-22 NOTE — PROGRESS NOTES
Chief Complaint:   Chief Complaint   Patient presents with    Elbow Pain     left, a little better, but still pain with use and lifting, TR MRI          History of Present Illness:       Patient is a 53 y.o. female returns follow up for the above complaint. The patient was last seen approximately 2 weeksago. The symptoms show no change since the last visit. The patient has had further testing for the problem. In the interim MRI scan completed which is outlined below in detail    Symptoms relatively unchanged localized to the cubital fossa region. No new injuries no new events    She denies any mechanical symptoms or subjective instability at least mild therapeutic benefit from a trial of meloxicam     Past Medical History:        Past Medical History:   Diagnosis Date    Allergic     Neuroma     Staph skin infection Oct 2012    knee        Present Medications:         Current Outpatient Medications   Medication Sig Dispense Refill    meloxicam (MOBIC) 15 MG tablet Take 1 tablet by mouth daily 30 tablet 1    Cholecalciferol (VITAMIN D3) 50 MCG (2000 UT) CAPS Take by mouth      Magnesium Hydroxide (MAGNESIA PO) Take by mouth      IRON, FERROUS GLUCONATE, PO Take by mouth       Current Facility-Administered Medications   Medication Dose Route Frequency Provider Last Rate Last Admin    methylPREDNISolone acetate (DEPO-MEDROL) injection 80 mg  80 mg Intra-articular Once Navneet Bradley DO             Allergies: Allergies   Allergen Reactions    Amoxicillin     Penicillins            Review of Systems:    Pertinent items are noted in HPI        Vital Signs: There were no vitals filed for this visit.      General Exam:     Constitutional: Patient is adequately groomed with no evidence of malnutrition    Physical Exam: left elbow      Primary Exam:    Inspection: No deformity attributable effusion      Palpation: Positive in the region of the radial tuberosity      Range of Motion: Full range symmetric at the elbow      Strength: Normal low-grade resisted pain with supination      Special Tests: Negative      Skin: There are no rashes, ulcerations or lesions. Gait: Nonantalgic      Neurovascular - non focal and intact       Additional Comments:        Additional Examinations:                Office Imaging Results/Procedures PerformedToday:            Office Procedures:     Orders Placed This Encounter   Procedures    Amb External Referral To Physical Therapy     Referral Priority:   Routine     Referral Type:   Consult for Advice and Opinion     Requested Specialty:   Physical Therapy     Number of Visits Requested:   1           Other Outside Imaging and Testing Personally Reviewed:    FRANCISCO J DIGITAL DIAGNOSTIC W OR WO CAD BILATERAL      MRI Upper Extremity W JT WO Contrast    Result Date: 2021  Site: [facility] #: [id number] #: [id number] Procedure: MR Left Elbow joint w/o Contrast ; Reason for Exam: left elbow pain biceps tendonitis post traumatic This document is confidential medical information. Unauthorized disclosure or use of this information is prohibited by law. If you are not the intended recipient of this document, please advise us by calling immediately [phone]. [facility] [address] Patient Name: Radha Edouard Case ID: [id number] Patient : 1970 Referring Physician: Tono Negrete MD Exam Date: 06/15/2021 Exam Description: MR Left Elbow joint w/o Contrast HISTORY:  Biceps pain. TECHNICAL FACTORS:  Long- and short-axis fat- and water-weighted images were performed. COMPARISON:  None. FINDINGS:  Intact extensor tendon origin. Flexor pronator group origin intact. Triceps and brachialis insertions are intact. Focally tendinopathic and hypertrophic biceps insertion. Mild peritendinitis. No macrotear. Intermediate grade chondromalacia radial head. Subtle marrow reaction. No acute osteochondral  fracture.   No loose body. No soft tissue mass. CONCLUSION: 1. Focally tendinopathic and hypertrophic biceps insertion either related to repetitive microtrauma or strain. Mild peritendinitis. No macrotear. 2. 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 Subtle marrow reaction. 3. Please see above. Thank you for the opportunity to provide your interpretation. Miguel Angel Cleveland MD A: NITISH/pranav 06/17/2021 11:08 AM T: PRANAV 06/17/2021 10:51 AM              Assessment   Impression: . Encounter Diagnoses   Name Primary?  Biceps tendinitis, left     Tendinitis of left elbow       Distal biceps insertional tendinopathy        Plan: Activity modification distal biceps protocol  Formal course of PT eccentric strengthening  Consider H XT bracing as needed  Consider candidate for [id number] as needed of the distal biceps tendon  Transition to as needed use of meloxicam with GI precaution         Orders:        Orders Placed This Encounter   Procedures    Amb External Referral To Physical Therapy     Referral Priority:   Routine     Referral Type:   Consult for Advice and Opinion     Requested Specialty:   Physical Therapy     Number of Visits Requested:   1         Ajit Spears MD.      Rodriguez Galvan: \"This note was dictated with voice recognition software. Though review and correction are routine, we apologize for any errors. \"

## 2021-07-28 ENCOUNTER — TELEPHONE (OUTPATIENT)
Dept: FAMILY MEDICINE CLINIC | Age: 51
End: 2021-07-28

## 2021-07-28 NOTE — TELEPHONE ENCOUNTER
Patient requesting pre-physical [facility] lab appointment prior to her physical in September. Please enter orders if approved and return call to schedule labs.

## 2021-08-03 ENCOUNTER — OFFICE VISIT (OUTPATIENT)
Dept: ORTHOPEDIC SURGERY | Age: 51
End: 2021-08-03
Payer: COMMERCIAL

## 2021-08-03 VITALS — WEIGHT: 117.06 LBS | HEIGHT: 67 IN | BODY MASS INDEX: 18.37 KG/M2

## 2021-08-03 DIAGNOSIS — M77.8 TENDINITIS OF LEFT ELBOW: ICD-10-CM

## 2021-08-03 DIAGNOSIS — M75.22 BICEPS TENDINITIS, LEFT: Primary | ICD-10-CM

## 2021-08-03 DIAGNOSIS — S46.212A RUPTURE OF LEFT DISTAL BICEPS TENDON, INITIAL ENCOUNTER: ICD-10-CM

## 2021-08-03 PROCEDURE — 99213 OFFICE O/P EST LOW 20 MIN: CPT | Performed by: INTERNAL MEDICINE

## 2021-08-03 NOTE — PROGRESS NOTES
Chief Complaint:   Chief Complaint   Patient presents with    Elbow Pain     left, improving with PT, sore after manual therapy, pain with pulling motion          History of Present Illness:       Patient is a 53 y.o. female returns follow up for the above complaint. The patient was last seen approximately 6 weeksago. The symptoms show no change since the last visit. The patient has had no further testing for the problem. Overall symptoms are unchanged and follow a typical provocative pattern consistent with distal biceps tendon injury. She continues with PT and only intermittent use of meloxicam    No new injuries no new events no mechanical symptoms she denies any subjective instability about the elbow         Past Medical History:        Past Medical History:   Diagnosis Date    Allergic     Neuroma     Staph skin infection Oct 2012    knee        Present Medications:         Current Outpatient Medications   Medication Sig Dispense Refill    meloxicam (MOBIC) 15 MG tablet Take 1 tablet by mouth daily 30 tablet 1    Cholecalciferol (VITAMIN D3) 50 MCG (2000 UT) CAPS Take by mouth      Magnesium Hydroxide (MAGNESIA PO) Take by mouth      IRON, FERROUS GLUCONATE, PO Take by mouth       Current Facility-Administered Medications   Medication Dose Route Frequency Provider Last Rate Last Admin    methylPREDNISolone acetate (DEPO-MEDROL) injection 80 mg  80 mg Intra-articular Once Navneet Bradley DO             Allergies: Allergies   Allergen Reactions    Amoxicillin     Penicillins            Review of Systems:    Pertinent items are noted in HPI        Vital Signs: There were no vitals filed for this visit.      General Exam:     Constitutional: Patient is adequately groomed with no evidence of malnutrition    Physical Exam: left elbow      Primary Exam:    Inspection: No deformity atrophy appreciable effusion      Palpation: Mild tenderness in the region of the biceps tendon insertion      Range of Motion: Full range symmetric without pain      Strength: Normal with mild resisted pain supination greater than flexion      Special Tests: Negative hook test      Skin: There are no rashes, ulcerations or lesions. Gait: Nonantalgic      Neurovascular - non focal and intact       Additional Comments:        Additional Examinations:              Office Imaging Results/Procedures PerformedToday:            Office Procedures:     Orders Placed This Encounter   Procedures    US EXTREMITY LEFT NON VASC LIMITED     Standing Status:   Future     Number of Occurrences:   1     Standing Expiration Date:   8/3/2022     Order Specific Question:   Reason for exam:     Answer:   dx   Limited ultrasound evaluation-left elbow  Logiq E ultrasound 12 MHz    Patient position seated with the forearm supported on examination table elbow flexed at 90 degrees and forearm hyper pronated. The linear transducer was placed over the radial window for evaluation of the long head biceps tendon insertion at the radial tuberosity. The distal tendon insertion was visualized. The tendon was mildly increased in caliber suggestive of tendinopathy change with no evidence of high-grade tendinopathy change. There was no evidence of discrete tear or retracted tear    No other soft tissue or osseous abnormalities        Other Outside Imaging and Testing Personally Reviewed:    No results found. Assessment   Impression: . Encounter Diagnoses   Name Primary?  Biceps tendinitis, left Yes    Tendinitis of left elbow     Rupture of left distal biceps tendon, initial encounter               Plan: Activity modification distal biceps tendon protocol  Proceed with ultrasound-guided [id number] injection distal biceps tendon insertion.          Orders:        Orders Placed This Encounter   Procedures    US EXTREMITY LEFT NON VASC LIMITED     Standing Status:   Future     Number of Occurrences:   1 Standing Expiration Date:   8/3/2022     Order Specific Question:   Reason for exam:     Answer:   pedro pablo Torres MD.      Disclaimer: \"This note was dictated with voice recognition software. Though review and correction are routine, we apologize for any errors. \"

## 2021-08-26 ENCOUNTER — TELEPHONE (OUTPATIENT)
Dept: FAMILY MEDICINE CLINIC | Age: 51
End: 2021-08-26

## 2021-08-26 NOTE — TELEPHONE ENCOUNTER
Recommended to patient go to her nearest Urgent Care to get Covid tested due to exposure and she is now having symptoms.

## 2021-08-26 NOTE — TELEPHONE ENCOUNTER
Pt called back to let Dr Anabelle Asencio know that she tested positive for covid. She want to know is the next steps beside quarantining. Is med's that she can be given.  Please give pt a call [phone]

## 2021-08-26 NOTE — TELEPHONE ENCOUNTER
Spoke with pt and she is on her way to an urgent care now, states her son did test positive and  still admitted.

## 2021-08-27 NOTE — TELEPHONE ENCOUNTER
Spoke to pt, she stated she has NO SOB, slight fever 100.3, some nasal congestion, she is drinking fluids and taking tylenol,

## 2021-08-27 NOTE — TELEPHONE ENCOUNTER
With no coughing or shortness of breath no additional treatment needed  just treat symptoms with tylenol and decongestant

## 2021-08-29 ENCOUNTER — HOSPITAL ENCOUNTER (EMERGENCY)
Age: 51
Discharge: HOME | End: 2021-08-29
Payer: COMMERCIAL

## 2021-08-29 ENCOUNTER — TELEPHONE (OUTPATIENT)
Dept: PRIMARY CARE CLINIC | Age: 51
End: 2021-08-29

## 2021-08-29 VITALS
BODY MASS INDEX: 18.21 KG/M2 | TEMPERATURE: 98.4 F | DIASTOLIC BLOOD PRESSURE: 54 MMHG | RESPIRATION RATE: 18 BRPM | HEIGHT: 67 IN | HEART RATE: 51 BPM | WEIGHT: 116 LBS | OXYGEN SATURATION: 99 % | SYSTOLIC BLOOD PRESSURE: 94 MMHG

## 2021-08-29 DIAGNOSIS — U07.1 COVID-19: Primary | ICD-10-CM

## 2021-08-29 PROCEDURE — 99282 EMERGENCY DEPT VISIT SF MDM: CPT

## 2021-08-29 RX ORDER — ASPIRIN 81 MG/1
TABLET, CHEWABLE ORAL
Qty: 60 TABLET | Refills: 0 | Status: SHIPPED | OUTPATIENT
Start: 2021-08-29 | End: 2022-05-25

## 2021-08-29 NOTE — TELEPHONE ENCOUNTER
Patient called stating that she recently tested positive for Covid. She states that she had a 102 fever when she had gone to take Tylenol had passed out she reports that her fever is now 101. She also reports that her pulse ox had read at 93 to 94%. She is having shortness of breath. She states that her niece is a nurse practitioner states that she should call them to get an antibiotic like a Z-Joel. I advised patient that we would not be able to call in an antibiotic and that she needs to go to an urgent care or emergency department for proper evaluation. At the very least she may need a chest x-ray and blood work. Patient verbalizes understanding.

## 2021-08-29 NOTE — ED PROVIDER NOTES
**ADVANCED PRACTICE PROVIDER, I HAVE EVALUATED THIS PATIENT**        [facility]      Pt Name: Moose Freedman  EOY:[id number]  Birthdate 1970  Date of evaluation: 8/29/2021  Provider: Abdoulaye Moya PA-C      Chief Complaint:    Chief Complaint   Patient presents with    Positive For Covid-19     Pt tested positive for Covid on August 26th, been having a fever on and off, fever was 103 this morning. Pt took oxygen saturation this morning and saturation was 93%. Nursing Notes, Past Medical Hx, Past Surgical Hx, Social Hx, Allergies, and Family Hx were all reviewed and agreed with or any disagreements were addressed in the HPI.    HPI: (Location, Duration, Timing, Severity, Quality, Assoc Sx, Context, Modifying factors)    Chief Complaint of COVID-19    This is a  53 y.o. female who presents to the emergency department, stating that her  is currently on BiPAP in our hospital with COVID-19, her youngest son is positive for Covid, and on August 24 or 25 she started having symptoms of runny nose, cough and congestion. On August 26, 3 days ago she got a test which was positive. She was doing well up until the last 2 days when she noticed a fever T-max 102 °F.  She has had some fevers, and sinus congestion. She states that her niece is a nurse practitioner working in a Covid unit, and she texted her stating that her oxygen saturation rate on her finger home pulse ox was in the 90s/low 90s, and she was told to come to the emergency department for evaluation. She states that she is unvaccinated. She denies any chest pain, shortness of breath, difficulty breathing, denies any chest pain. She states that she was really very worried about Covid because her oxygen was in the low 90s. Also because of the fever.   She was also told by her niece that she should ask for a monoclonal antibody treatment to be given in the emergency department and for a chest x-ray. She is currently only taking Tylenol, not taking ibuprofen or baby aspirin. PastMedical/Surgical History:      Diagnosis Date    Allergic     Neuroma     Staph skin infection Oct 2012    knee         Procedure Laterality Date    FINGER SURGERY  2012    remove infection    FOOT SURGERY      seasmoidectomy       Medications:  Previous Medications    CHOLECALCIFEROL (VITAMIN D3) 50 MCG (2000 UT) CAPS    Take by mouth    IRON, FERROUS GLUCONATE, PO    Take by mouth    MAGNESIUM HYDROXIDE (MAGNESIA PO)    Take by mouth    MELOXICAM (MOBIC) 15 MG TABLET    Take 1 tablet by mouth daily         Review of Systems:  (2-9 systems needed)  Review of Systems    \"Positives and Pertinent negatives as per HPI\"    Physical Exam:  Physical Exam  Vitals and nursing note reviewed. Constitutional:       General: She is not in acute distress. Appearance: Normal appearance. She is well-developed and normal weight. She is not ill-appearing, toxic-appearing or diaphoretic. HENT:      Head: Normocephalic and atraumatic. Right Ear: External ear normal.      Left Ear: External ear normal.      Nose: Nose normal.      Mouth/Throat:      Mouth: Mucous membranes are moist.   Eyes:      General:         Right eye: No discharge. Left eye: No discharge. Cardiovascular:      Rate and Rhythm: Normal rate and regular rhythm. Heart sounds: Normal heart sounds. No murmur heard. No friction rub. No gallop. Pulmonary:      Effort: Pulmonary effort is normal. No respiratory distress. Breath sounds: Normal breath sounds. No stridor. No wheezing or rales. Chest:      Chest wall: No tenderness. Abdominal:      General: There is no abdominal bruit. Palpations: Abdomen is not rigid. There is no pulsatile mass. Tenderness: Negative signs include Lemus's sign and McBurney's sign. Musculoskeletal:         General: No swelling. Normal range of motion.       Cervical back: Normal range of motion and neck supple. Skin:     General: Skin is warm and dry. Coloration: Skin is not pale. Neurological:      General: No focal deficit present. Mental Status: She is alert and oriented to person, place, and time. Gait: Gait normal.   Psychiatric:         Mood and Affect: Mood normal.         Behavior: Behavior normal.         MEDICAL DECISION MAKING    Vitals:    Vitals:    08/29/21 0939   BP: (!) 94/54   Pulse: 51   Resp: 18   Temp: 98.4 °F (36.9 °C)   TempSrc: Oral   SpO2: 99%   Weight: 116 lb (52.6 kg)   Height: 5' 7\" (1.702 m)       LABS:Labs Reviewed - No data to display     Remainder of labs reviewed and were negative at this time or not returned at the time of this note. RADIOLOGY:   Non-plain film images such as CT, Ultrasound and MRI are read by the radiologist. Uzma Clements PA-C have directly visualized the radiologic plain film image(s) with the below findings:      Interpretation per the Radiologist below, if available at the time of this note:    No orders to display            [facility] / ED COURSE:      PROCEDURES:   Procedures    None    Patient was given:  Medications - No data to display    I spent approximately 10 minutes discussing the etiology, signs and symptoms, and concerning factors of COVID-19 including tachycardia, hypoxia, severe shortness of breath. I did encourage the patient to get vaccinated, even after she gets well as this has been shown and proven to decrease future illnesses better than natural immunity from Covid infection. She did express some frustration about not getting a chest x-ray or the monoclonal antibody testing. I encouraged her to contact her family physician, that [facility]) at this time did not have the ability to do outpatient monoclonal antibody testing, and that he had no signs of moderate illness at this point to indicate monoclonal antibody infusion in the emergency department.     I encouraged the patient to return to the emergency department with other concerns, if her oxygen saturation rate is sustained under 90% or she is tachycardic above 95. The patient tolerated their visit well. I evaluated the patient. The physician was available for consultation as needed. The patient and / or the family were informed of the results of any tests, a time was given to answer questions, a plan was proposed and they agreed with plan. CLINICAL IMPRESSION:  No diagnosis found. DISPOSITION        PATIENT REFERRED TO:  No follow-up provider specified.     DISCHARGE MEDICATIONS:  New Prescriptions    No medications on file       DISCONTINUED MEDICATIONS:  Discontinued Medications    No medications on file              (Please note the MDM and HPI sections of this note were completed with a voice recognition program.  Efforts were made to edit the dictations but occasionally words are mis-transcribed.)    Electronically signed, Terrell Richardson PA-C,          Terrell Richardson PA-C  08/29/21 1117

## 2021-08-30 ENCOUNTER — CARE COORDINATION (OUTPATIENT)
Dept: CARE COORDINATION | Age: 51
End: 2021-08-30

## 2021-08-30 ENCOUNTER — TELEPHONE (OUTPATIENT)
Dept: GYNECOLOGY | Age: 51
End: 2021-08-30

## 2021-08-30 NOTE — TELEPHONE ENCOUNTER
Pt called again stating she had gone to the ED for evaluation. ED found her O2 saturations to be 100% . States that ED told her she would be a candidate for monoclonal antibody. Reports she had sent [employer] message to . Advised to contact the office for follow up.

## 2021-08-30 NOTE — TELEPHONE ENCOUNTER
Pt called back to ask Dr Joanna Butterfield to fax a order for the Monoclonal Antibody Treatment to [facility], [facility] [phone].

## 2021-09-01 ENCOUNTER — CARE COORDINATION (OUTPATIENT)
Dept: CARE COORDINATION | Age: 51
End: 2021-09-01

## 2021-09-21 ENCOUNTER — NURSE ONLY (OUTPATIENT)
Dept: FAMILY MEDICINE CLINIC | Age: 51
End: 2021-09-21
Payer: COMMERCIAL

## 2021-09-21 DIAGNOSIS — Z00.00 PREVENTATIVE HEALTH CARE: Primary | ICD-10-CM

## 2021-09-21 LAB
A/G RATIO: 2.2 (ref 1.1–2.2)
ALBUMIN SERPL-MCNC: 4.6 G/DL (ref 3.4–5)
ALP BLD-CCNC: 70 U/L (ref 40–129)
ALT SERPL-CCNC: 18 U/L (ref 10–40)
ANION GAP SERPL CALCULATED.3IONS-SCNC: 10 MMOL/L (ref 3–16)
AST SERPL-CCNC: 25 U/L (ref 15–37)
BILIRUB SERPL-MCNC: 0.4 MG/DL (ref 0–1)
BUN BLDV-MCNC: 16 MG/DL (ref 7–20)
CALCIUM SERPL-MCNC: 9.6 MG/DL (ref 8.3–10.6)
CHLORIDE BLD-SCNC: 103 MMOL/L (ref 99–110)
CHOLESTEROL, TOTAL: 156 MG/DL (ref 0–199)
CO2: 27 MMOL/L (ref 21–32)
CREAT SERPL-MCNC: 0.6 MG/DL (ref 0.6–1.1)
GFR AFRICAN AMERICAN: >60
GFR NON-AFRICAN AMERICAN: >60
GLOBULIN: 2.1 G/DL
GLUCOSE BLD-MCNC: 102 MG/DL (ref 70–99)
HCT VFR BLD CALC: 40.5 % (ref 36–48)
HDLC SERPL-MCNC: 54 MG/DL (ref 40–60)
HEMOGLOBIN: 13.8 G/DL (ref 12–16)
LDL CHOLESTEROL CALCULATED: NORMAL MG/DL
LDL CHOLESTEROL DIRECT: 102 MG/DL
MCH RBC QN AUTO: 31.9 PG (ref 26–34)
MCHC RBC AUTO-ENTMCNC: 34.1 G/DL (ref 31–36)
MCV RBC AUTO: 93.6 FL (ref 80–100)
PDW BLD-RTO: 13.8 % (ref 12.4–15.4)
PLATELET # BLD: 137 K/UL (ref 135–450)
PMV BLD AUTO: 9.4 FL (ref 5–10.5)
POTASSIUM SERPL-SCNC: 4.8 MMOL/L (ref 3.5–5.1)
RBC # BLD: 4.33 M/UL (ref 4–5.2)
SODIUM BLD-SCNC: 140 MMOL/L (ref 136–145)
TOTAL PROTEIN: 6.7 G/DL (ref 6.4–8.2)
TRIGL SERPL-MCNC: 25 MG/DL (ref 0–150)
TSH SERPL DL<=0.05 MIU/L-ACNC: 0.8 UIU/ML (ref 0.27–4.2)
VLDLC SERPL CALC-MCNC: NORMAL MG/DL
WBC # BLD: 2.5 K/UL (ref 4–11)

## 2021-09-21 PROCEDURE — 36415 COLL VENOUS BLD VENIPUNCTURE: CPT | Performed by: FAMILY MEDICINE

## 2021-09-28 ENCOUNTER — OFFICE VISIT (OUTPATIENT)
Dept: FAMILY MEDICINE CLINIC | Age: 51
End: 2021-09-28
Payer: COMMERCIAL

## 2021-09-28 VITALS
BODY MASS INDEX: 17.58 KG/M2 | SYSTOLIC BLOOD PRESSURE: 92 MMHG | DIASTOLIC BLOOD PRESSURE: 68 MMHG | WEIGHT: 112 LBS | HEIGHT: 67 IN

## 2021-09-28 DIAGNOSIS — Z00.00 WELL ADULT EXAM: Primary | ICD-10-CM

## 2021-09-28 DIAGNOSIS — F43.21 GRIEF REACTION: ICD-10-CM

## 2021-09-28 PROCEDURE — 99396 PREV VISIT EST AGE 40-64: CPT | Performed by: FAMILY MEDICINE

## 2021-09-28 ASSESSMENT — ENCOUNTER SYMPTOMS
RESPIRATORY NEGATIVE: 1
GASTROINTESTINAL NEGATIVE: 1

## 2021-09-28 ASSESSMENT — PATIENT HEALTH QUESTIONNAIRE - PHQ9
1. LITTLE INTEREST OR PLEASURE IN DOING THINGS: 0
SUM OF ALL RESPONSES TO PHQ QUESTIONS 1-9: 0
SUM OF ALL RESPONSES TO PHQ9 QUESTIONS 1 & 2: 0
2. FEELING DOWN, DEPRESSED OR HOPELESS: 0
SUM OF ALL RESPONSES TO PHQ QUESTIONS 1-9: 0
SUM OF ALL RESPONSES TO PHQ QUESTIONS 1-9: 0

## 2021-09-28 NOTE — PROGRESS NOTES
OUTPATIENT PROGRESS NOTE  Date of Service:  2021  Address: [facility]  [facility] [address] [address]  Dept: [phone]  Loc: [phone]    Subjective:      Patient ID:  [id number]  Claudette Brunette is a 53 y.o. female     HPI  Well exam  Here for physical    Her   from covid  Age 47    Two boys   She also had covid   21  Review of Systems   Constitutional: Negative. Respiratory: Negative. Cardiovascular: Negative. Gastrointestinal: Negative. Skin: Negative. Neurological: Negative. Objective:   YOB: 1970    Date of Visit:  2021       Allergies   Allergen Reactions    Amoxicillin     Penicillins        Outpatient Medications Marked as Taking for the 21 encounter (Office Visit) with Pili Risk, DO   Medication Sig Dispense Refill    Ascorbic Acid (VITAMIN C PO) Take by mouth      aspirin (ASPIRIN CHILDRENS) 81 MG chewable tablet 2 tablets by mouth daily for 21 days then 1 tablet daily after 60 tablet 0    Cholecalciferol (VITAMIN D3) 50 MCG ( UT) CAPS Take by mouth      Magnesium Hydroxide (MAGNESIA PO) Take by mouth      IRON, FERROUS GLUCONATE, PO Take by mouth         Vitals:    21 0752   BP: 92/68   Weight: 112 lb (50.8 kg)   Height: 5' 7\" (1.702 m)     Body mass index is 17.54 kg/m². Wt Readings from Last 3 Encounters:   21 112 lb (50.8 kg)   21 116 lb (52.6 kg)   21 117 lb 1 oz (53.1 kg)     BP Readings from Last 3 Encounters:   21 92/68   21 (!) 94/54   20 90/62       Physical Exam  Vitals and nursing note reviewed. Constitutional:       General: She is not in acute distress. Appearance: She is well-developed. Comments: Thin appearing   HENT:      Head: Normocephalic.       Right Ear: External ear normal.      Left Ear: External ear normal.      Nose: Nose normal.   Eyes:      General: Left eye: No discharge. Conjunctiva/sclera: Conjunctivae normal.      Pupils: Pupils are equal, round, and reactive to light. Neck:      Thyroid: No thyromegaly. Cardiovascular:      Rate and Rhythm: Normal rate and regular rhythm. Heart sounds: Normal heart sounds. No murmur heard. Pulmonary:      Effort: Pulmonary effort is normal. No respiratory distress. Breath sounds: No wheezing or rales. Abdominal:      General: There is no distension. Palpations: Abdomen is soft. There is no mass. Tenderness: There is no guarding. Musculoskeletal:      Cervical back: Normal range of motion. Lymphadenopathy:      Cervical: No cervical adenopathy. Skin:     General: Skin is warm and dry. Findings: No erythema or rash. Neurological:      Mental Status: She is alert and oriented to person, place, and time. Deep Tendon Reflexes: Reflexes are normal and symmetric. Psychiatric:         Behavior: Behavior normal.         Thought Content:  Thought content normal.         Judgment: Judgment normal.            Assessment/Plan                Assessment/plan;  Tomasz Sarvaia was seen today for annual exam.    Diagnoses and all orders for this visit:    Well adult exam    Grief reaction      Support given on loss of   Reviewed lab  Repeat one year           Darius Spear DO

## 2021-09-28 NOTE — LETTER
[address] [facility] [address] [facility],First Floor [address]  Phone: [phone]  Fax: [phone]    Duran Merchant DO        September 28, 2021     Patient: Gracia Olmedo   YOB: 1970   Date of Visit: 9/28/2021       To Whom it May Concern:    Gracia Olmedo was seen in my clinic on 9/28/2021. If you have any questions or concerns, please don't hesitate to call.     Sincerely,         Duran Merchant DO

## 2022-03-28 ENCOUNTER — OFFICE VISIT (OUTPATIENT)
Dept: FAMILY MEDICINE CLINIC | Age: 52
End: 2022-03-28
Payer: COMMERCIAL

## 2022-03-28 ENCOUNTER — TELEPHONE (OUTPATIENT)
Dept: FAMILY MEDICINE CLINIC | Age: 52
End: 2022-03-28

## 2022-03-28 VITALS
DIASTOLIC BLOOD PRESSURE: 70 MMHG | HEIGHT: 67 IN | BODY MASS INDEX: 18.39 KG/M2 | TEMPERATURE: 98 F | WEIGHT: 117.2 LBS | SYSTOLIC BLOOD PRESSURE: 110 MMHG

## 2022-03-28 DIAGNOSIS — F41.1 GAD (GENERALIZED ANXIETY DISORDER): ICD-10-CM

## 2022-03-28 DIAGNOSIS — F43.21 GRIEF REACTION: Primary | ICD-10-CM

## 2022-03-28 PROCEDURE — 99214 OFFICE O/P EST MOD 30 MIN: CPT | Performed by: FAMILY MEDICINE

## 2022-03-28 RX ORDER — HYDROXYZINE PAMOATE 25 MG/1
25 CAPSULE ORAL EVERY 8 HOURS PRN
Qty: 30 CAPSULE | Refills: 1 | Status: SHIPPED | OUTPATIENT
Start: 2022-03-28 | End: 2022-04-07

## 2022-03-28 ASSESSMENT — PATIENT HEALTH QUESTIONNAIRE - PHQ9
SUM OF ALL RESPONSES TO PHQ9 QUESTIONS 1 & 2: 2
SUM OF ALL RESPONSES TO PHQ QUESTIONS 1-9: 2
2. FEELING DOWN, DEPRESSED OR HOPELESS: 1
SUM OF ALL RESPONSES TO PHQ QUESTIONS 1-9: 2
1. LITTLE INTEREST OR PLEASURE IN DOING THINGS: 1

## 2022-03-28 ASSESSMENT — ENCOUNTER SYMPTOMS: CHEST TIGHTNESS: 1

## 2022-03-28 NOTE — TELEPHONE ENCOUNTER
----- Message from Leora Duronell sent at 3/28/2022 12:50 PM EDT -----  Subject: Message to Provider    QUESTIONS  Information for Provider? Pt forgot to get Work Note for today 3/28. Would   like to know if one can be faxed to her employer. Fax # [phone] Mitchell Zayas  ---------------------------------------------------------------------------  --------------  Adrian MARINA  What is the best way for the office to contact you? OK to leave message on   voicemail  Preferred Call Back Phone Number? [phone]  ---------------------------------------------------------------------------  --------------  SCRIPT ANSWERS  Relationship to Patient?  Self

## 2022-03-28 NOTE — LETTER
[facility]  [address] [place] [address],First Floor [address]  Phone: [phone]  Fax: [phone]    Jimbo Banks DO        March 28, 2022     Patient: Carina Alexandra   YOB: 1970   Date of Visit: 3/28/2022       To Whom it May Concern:    Carina Alexandra was seen in my clinic on 3/28/2022. Please excuse her from work one 3/28/22 for medical reasons. If you have any questions or concerns, please don't hesitate to call.     Sincerely,         Jimbo Banks DO

## 2022-03-29 NOTE — PROGRESS NOTES
Aamir Pacheco (:  1970) is a 49 y.o. female,Established patient, here for evaluation of the following chief complaint(s):  Mental Health Problem ( passed in September. having issues with son regarding acting out.  )         ASSESSMENT/PLAN:  1. Grief reaction  2. KEAGAN (generalized anxiety disorder)  -     hydrOXYzine (VISTARIL) 25 MG capsule; Take 1 capsule by mouth every 8 hours as needed for Itching, Disp-30 capsule, R-1Normal    Support given  encourged to joint widows support group          Subjective   SUBJECTIVE/OBJECTIVE:  HPI  Grief anxiety  Really struggling   Her   suddenly of covid  She is dealing with her grief and her son is acting out  Not wanting to go to school  He is resistant to therapy  No male role model for him  Stressed all the time  Her heart is racing   Could not finish her run the other day due to  Heart racing   She has been looking for a  support group     Review of Systems   Constitutional: Positive for activity change and fatigue. Respiratory: Positive for chest tightness. Cardiovascular: Positive for palpitations. Psychiatric/Behavioral: Positive for agitation, decreased concentration and sleep disturbance. The patient is nervous/anxious. Objective   Physical Exam  Constitutional:       General: She is not in acute distress. Appearance: She is well-developed. HENT:      Head: Normocephalic. Neurological:      Mental Status: She is alert and oriented to person, place, and time. Psychiatric:      Comments: Tearful and anxious            On this date 3/28/2022 I have spent 35 minutes reviewing previous notes, test results and face to face with the patient discussing the diagnosis and importance of compliance with the treatment plan as well as documenting on the day of the visit. An electronic signature was used to authenticate this note.     --Carmenza Brandt DO

## 2022-04-26 SDOH — HEALTH STABILITY: PHYSICAL HEALTH: ON AVERAGE, HOW MANY MINUTES DO YOU ENGAGE IN EXERCISE AT THIS LEVEL?: 40 MIN

## 2022-04-26 SDOH — HEALTH STABILITY: PHYSICAL HEALTH: ON AVERAGE, HOW MANY DAYS PER WEEK DO YOU ENGAGE IN MODERATE TO STRENUOUS EXERCISE (LIKE A BRISK WALK)?: 6 DAYS

## 2022-04-27 ENCOUNTER — OFFICE VISIT (OUTPATIENT)
Dept: ORTHOPEDIC SURGERY | Age: 52
End: 2022-04-27
Payer: COMMERCIAL

## 2022-04-27 VITALS — HEIGHT: 67 IN | WEIGHT: 111.99 LBS | BODY MASS INDEX: 17.58 KG/M2

## 2022-04-27 DIAGNOSIS — M25.511 RIGHT SHOULDER PAIN, UNSPECIFIED CHRONICITY: ICD-10-CM

## 2022-04-27 DIAGNOSIS — S46.011A STRAIN OF RIGHT ROTATOR CUFF CAPSULE, INITIAL ENCOUNTER: ICD-10-CM

## 2022-04-27 PROCEDURE — 99214 OFFICE O/P EST MOD 30 MIN: CPT | Performed by: INTERNAL MEDICINE

## 2022-04-27 RX ORDER — MELOXICAM 15 MG/1
15 TABLET ORAL DAILY PRN
Qty: 30 TABLET | Refills: 2 | Status: SHIPPED | OUTPATIENT
Start: 2022-04-27 | End: 2022-09-18

## 2022-04-27 NOTE — PROGRESS NOTES
Chief Complaint:   Chief Complaint   Patient presents with    Shoulder Pain     R shoulder started hurting a week ago after moving a mini fridge down her stairs and it fell off of a ja and she had to catch it., hurts to do cross body and to lift arm above head. achy and occasional stabbing pain           History of Present Illness:       Patient is a 49 y.o. female presents with the above complaint. The symptoms began 1 weeksago and started with an injury. The patient describes a sharp pain that does not radiate. The symptoms are constant  and are show no change since the onset. The symptoms do show a typical rotator cuff provacative pattern. The pain is  anterior about the shoulder. There is not associated mechanical symptoms localizing to the shoulder. The patient denies symptoms of instability about the shoulder. Treatment to date has included nothing specific. Pain levels 1-8/10. The symptoms do not correlate with head and neck movement. The patient denies new onset weakness of the upper extremity. The patient does not have history of prior shoulder trauma. There is no history or autoimmune disease, inflammatory arthropathy or crystal arthropathy.            Past Medical History:        Past Medical History:   Diagnosis Date    Allergic     Neuroma     Staph skin infection Oct 2012    knee         Past Surgical History:   Procedure Laterality Date    FINGER SURGERY  2012    remove infection    FOOT SURGERY      seasmoidectomy         Present Medications:         Current Outpatient Medications   Medication Sig Dispense Refill    diclofenac sodium (VOLTAREN) 1 % GEL Apply 2 to 4 g 3 times daily for 2 weeks then twice daily as needed thereafter 150 g 3    meloxicam (MOBIC) 15 MG tablet Take 1 tablet by mouth daily as needed for Pain 30 tablet 2    Ascorbic Acid (VITAMIN C PO) Take by mouth (Patient not taking: Reported on 3/28/2022)      aspirin (ASPIRIN CHILDRENS) 81 MG chewable tablet 2 tablets by mouth daily for 21 days then 1 tablet daily after (Patient not taking: Reported on 3/28/2022) 60 tablet 0    Cholecalciferol (VITAMIN D3) 50 MCG (2000 UT) CAPS Take by mouth      Magnesium Hydroxide (MAGNESIA PO) Take by mouth      IRON, FERROUS GLUCONATE, PO Take by mouth       Current Facility-Administered Medications   Medication Dose Route Frequency Provider Last Rate Last Admin    methylPREDNISolone acetate (DEPO-MEDROL) injection 80 mg  80 mg Intra-artICUlar Once Navneet Bradley DO             Allergies: Allergies   Allergen Reactions    Amoxicillin     Penicillins         Social History:         Social History     Socioeconomic History    Marital status:      Spouse name: Not on file    Number of children: Not on file    Years of education: Not on file    Highest education level: Not on file   Occupational History    Not on file   Tobacco Use    Smoking status: Never Smoker    Smokeless tobacco: Never Used    Tobacco comment: encouraged to never smoke    Substance and Sexual Activity    Alcohol use: Not Currently     Alcohol/week: 0.0 standard drinks    Drug use: No    Sexual activity: Not on file   Other Topics Concern    Not on file   Social History Narrative    Not on file     Social Determinants of Health     Financial Resource Strain:     Difficulty of Paying Living Expenses: Not on file   Food Insecurity:     Worried About Running Out of Food in the Last Year: Not on file    Dean of Food in the Last Year: Not on file   Transportation Needs:     Lack of Transportation (Medical): Not on file    Lack of Transportation (Non-Medical):  Not on file   Physical Activity: Sufficiently Active    Days of Exercise per Week: 6 days    Minutes of Exercise per Session: 40 min   Stress:     Feeling of Stress : Not on file   Social Connections:     Frequency of Communication with Friends and Family: Not on file    Frequency of Social Gatherings with Friends and Family: Not on file    Attends Yazidi Services: Not on file    Active Member of Clubs or Organizations: Not on file    Attends Club or Organization Meetings: Not on file    Marital Status: Not on file   Intimate Partner Violence: Not At Risk    Fear of Current or Ex-Partner: No    Emotionally Abused: No    Physically Abused: No    Sexually Abused: No   Housing Stability:     Unable to Pay for Housing in the Last Year: Not on file    Number of [place] in the Last Year: Not on file    Unstable Housing in the Last Year: Not on file        Review of Symptoms:    Pertinent items are noted in HPI    Review of systems reviewed from Patient History Form dated on  and   available in the patient's chart under the Media tab. Vital Signs: There were no vitals filed for this visit. General Exam:     Constitutional: Patient is adequately groomed with no evidence of malnutrition  Mental Status: The patient is oriented to time, place and person. The patient's mood and affect are appropriate. Vascular: Examination reveals no swelling or calf tenderness. Peripheral pulses are palpable and 2+. Lymphatics: no lymphadenopathy of the cervical or axillary regions or upper extremity      Physical Exam: right shoulder      Primary Exam:    Inspection: Atrophy appreciable effusion      Palpation: Focal tenderness      Range of Motion: Full range and symmetric low-grade discomfort endrange forward elevation and abduction      Strength: Near normal low-grade pain for elevation abduction and external rotation      Special Tests: Impingement sign negative proximal biceps signs negative subscapularis signs negative      Skin: There are no rashes, ulcerations or lesions. Gait: Nonantalgic      Reflex intact upper     Additional Comments:        Additional Examinations:           Left Upper Extremity: Examination of the left upper extremity does not show any tenderness, deformity or injury. Range of motion is unremarkable. There is no gross instability. There are no rashes, ulcerations or lesions. Strength and tone are normal.  Neck: Examination of the neck does not show any tenderness, deformity or injury. Range of motion is unremarkable. There is no gross instability. There are no rashes, ulcerations or lesions. Strength and tone are normal.  Neurologic -Light touch sensation and manual muscle testing is normal C5-C8 . Biceps and triceps reflexes are symmetric and +2. Spurlling sign is negative            Office Imaging Results/Procedures PerformedToday:      Radiology:      X-rays obtained and reviewed in office:   Views views right shoulder   Location right shoulder   Impression type I/II acromion morphology cystic changes within the greater tuberosity. No evidence of discrete fracture glenohumeral joint is congruent. Office Procedures:     Orders Placed This Encounter   Procedures    XR SHOULDER RIGHT (MIN 2 VIEWS)     Standing Status:   Future     Number of Occurrences:   1     Standing Expiration Date:   4/26/2023     Order Specific Question:   Reason for exam:     Answer:   pain           Other Outside Imaging and Testing Personally Reviewed:    XR SHOULDER RIGHT (MIN 2 VIEWS)    Result Date: 4/27/2022  Radiology exam is complete. No Radiologist dictation. Please follow up with ordering provider. Assessment   Impression: . Encounter Diagnoses   Name Primary?  Strain of right rotator cuff capsule, initial encounter     Right shoulder pain, unspecified chronicity               Plan:        Activity modification rotator cuff protocol-pendulum swing exercises and rotator cuff stretching  Meloxicam daily with GI precaution  Complete ultrasound evaluation of the shoulder on follow-up  Consider low-dose subacromial steroid injection under ultrasound guidance on follow-up      The nature of the finding, probable diagnosis and likely treatment was thoroughly discussed with the patient. The options, risks, complications, alternative treatment as well as some of the differential diagnosis was discussed. The patient was thoroughly informed and all questions were answered. the patient indicated understanding and satisfaction with the discussion. Orders:        Orders Placed This Encounter   Procedures    XR SHOULDER RIGHT (MIN 2 VIEWS)     Standing Status:   Future     Number of Occurrences:   1     Standing Expiration Date:   4/26/2023     Order Specific Question:   Reason for exam:     Answer:   pain           Disclaimer: \"This note was dictated with voice recognition software. Though review and correction are routine, we apologize for any errors. \"

## 2022-05-03 ENCOUNTER — OFFICE VISIT (OUTPATIENT)
Dept: ORTHOPEDIC SURGERY | Age: 52
End: 2022-05-03
Payer: COMMERCIAL

## 2022-05-03 VITALS — BODY MASS INDEX: 18.41 KG/M2 | HEIGHT: 67 IN | WEIGHT: 117.28 LBS

## 2022-05-03 DIAGNOSIS — M75.101 TEAR OF RIGHT SUPRASPINATUS TENDON: Primary | ICD-10-CM

## 2022-05-03 DIAGNOSIS — M75.51 BURSITIS OF RIGHT SHOULDER: ICD-10-CM

## 2022-05-03 DIAGNOSIS — M25.511 RIGHT SHOULDER PAIN, UNSPECIFIED CHRONICITY: ICD-10-CM

## 2022-05-03 PROCEDURE — 99214 OFFICE O/P EST MOD 30 MIN: CPT | Performed by: INTERNAL MEDICINE

## 2022-05-03 NOTE — PROGRESS NOTES
Chief Complaint:   Chief Complaint   Patient presents with    Shoulder Pain     feels 25% better in R shoulder, meloxicam is helpful, exercises are helpful as well, still having difficulty going to sleep due to discomfort          History of Present Illness:       Patient is a 49 y.o. female returns follow up for the above complaint. The patient was last seen approximately 1 weeksago. The symptoms are improving since the last visit. The patient has had no further testing for the problem. Although improved the symptoms remain problematic. Pain levels 2/10    Symptoms continue to follow rotator cuff provocative pattern    No new injuries no new events     Past Medical History:        Past Medical History:   Diagnosis Date    Allergic     Neuroma     Staph skin infection Oct 2012    knee        Present Medications:         Current Outpatient Medications   Medication Sig Dispense Refill    diclofenac sodium (VOLTAREN) 1 % GEL Apply 2 to 4 g 3 times daily for 2 weeks then twice daily as needed thereafter 150 g 3    meloxicam (MOBIC) 15 MG tablet Take 1 tablet by mouth daily as needed for Pain 30 tablet 2    Ascorbic Acid (VITAMIN C PO) Take by mouth (Patient not taking: Reported on 3/28/2022)      aspirin (ASPIRIN CHILDRENS) 81 MG chewable tablet 2 tablets by mouth daily for 21 days then 1 tablet daily after (Patient not taking: Reported on 3/28/2022) 60 tablet 0    Cholecalciferol (VITAMIN D3) 50 MCG (2000 UT) CAPS Take by mouth      Magnesium Hydroxide (MAGNESIA PO) Take by mouth      IRON, FERROUS GLUCONATE, PO Take by mouth       Current Facility-Administered Medications   Medication Dose Route Frequency Provider Last Rate Last Admin    methylPREDNISolone acetate (DEPO-MEDROL) injection 80 mg  80 mg Intra-artICUlar Once Navneet Bradley,              Allergies:         Allergies   Allergen Reactions    Amoxicillin     Penicillins            Review of Systems:    Pertinent items are noted in HPI      Vital Signs: There were no vitals filed for this visit. General Exam:     Constitutional: Patient is adequately groomed with no evidence of malnutrition    Physical Exam: right shoulder      Primary Exam:    Inspection: No deformity atrophy appreciable effusion      Palpation: No focal trigger point tenderness      Range of Motion: Full range and symmetric pain overhead      Special Tests: Negative drop arm      Skin: There are no rashes, ulcerations or lesions. Gait: Nonantalgiv    Neurovascular - non focal and intact    Additional Comments:        Additional Examinations:                  Office Imaging Results/Procedures PerformedToday:           Office Procedures:     Orders Placed This Encounter   Procedures    US EXTREMITY JOINT RIGHT NON VASC COMPLETE     Standing Status:   Future     Number of Occurrences:   1     Standing Expiration Date:   5/3/2023     Order Specific Question:   Reason for exam:     Answer:   dx    MRI SHOULDER RIGHT WO CONTRAST     [facility], please call pt to schedule, [phone]  [facility] will obtain auth and fwd to your facility. Pt advised to f/u in clinic 2-3 days after MRI for results. Standing Status:   Future     Standing Expiration Date:   5/3/2023     Order Specific Question:   Reason for exam:     Answer:   r/o labral tear     Order Specific Question:   What is the sedation requirement? Answer:   Deberah Cockayne, MD, Orthopedic Surgery, [facility]     Referral Priority:   Routine     Referral Type:   Eval and Treat     Referral Reason:   Specialty Services Required     Referred to Provider:   Mariama Lilly MD     Requested Specialty:   Orthopedic Surgery Sports Medicine     Number of Visits Requested:   1     Complete Ultrasound Shoulder  Logic E ultrasound  12 MHz    The patient was position seated on examination table. The anterior soft tissues of the RTshoulder were evaluated initially using linear transducer.   Image optimization was obtained. The proximal biceps tendon was evaluated extensively and short axis and long axis and was noted to have normal sonographic echotexture without evidence of high-grade tendinopathy or discrete tear. Small volume anechoic fluid signal within the biceps tendon sheath. With dynamic imaging there was no evidence of instability of the proximal biceps tendon. The subscapularis was then evaluated extensively and short axis and long axis. The tendon was noted to have normal caliber/volume without evidence of high-grade tendinopathy or discrete tear. The patient was then positioned in CRASS position and the supraspinatus and infraspinatus tendons were evaluated extensively and short axis and long axis. The supraspinatus was noted to have a full-thickness tear in its posterior segment distribution consistent with a  L-shaped morphology type tear. .  The infraspinatus had a normal sonographic appearance without evidence of high-grade tendinopathy or discrete tear. Power Doppler imaging negative    The subacromial bursa was mildly distended with anechoic fluid signal consistent with bursitis    Dynamic impingement test revealed no evidence of soft tissue subacromial impingement. Using virtual convex imaging, the posterior glenohumeral joint was visualized. There was no evidence of intra-articular effusion. The posterior labrum was identified but not well delineated on this particular examination. There was no evidence of intra-articular effusion. The acromial clavicular joint revealed low-grade degenerative change. There was no evidence of soft tissue mass or cystic lesions. Other Outside Imaging and Testing Personally Reviewed:    XR SHOULDER RIGHT (MIN 2 VIEWS)    Result Date: 4/27/2022  Radiology exam is complete. No Radiologist dictation. Please follow up with ordering provider.      US EXTREMITY JOINT RIGHT NON VASC COMPLETE    Result Date: 5/3/2022  Radiology result is complete; follow up with provider / physician office for radiology results              Assessment   Impression: . Encounter Diagnoses   Name Primary?  Tear of right supraspinatus tendon     Bursitis of right shoulder     Right shoulder pain, unspecified chronicity Yes              Plan: Activity modification rotator cuff protocol  MRI evaluation evaluate for concurrent labral pathology  Surgical consultation for rotator cuff repair-Dr. Steward Lesch. Maty Norris active modification  Shoulder flexibility program with caution    Approximately 30 minutes was spent related to previewing pertinent medical documentation prior to the patient's visit along with counseling during the patient's visit with respect to treatment options inclusive of alternatives to treatment and the complications and risks related to those treatment options along with expectations of outcome related to those treatments and inclusive of time in the documentation and ordering of testing and treatment after the visit. Orders:        Orders Placed This Encounter   Procedures    US EXTREMITY JOINT RIGHT NON VASC COMPLETE     Standing Status:   Future     Number of Occurrences:   1     Standing Expiration Date:   5/3/2023     Order Specific Question:   Reason for exam:     Answer:   dx    MRI SHOULDER RIGHT WO CONTRAST     [facility], please call pt to schedule, [phone]  [facility] will obtain auth and fwd to your facility. Pt advised to f/u in clinic 2-3 days after MRI for results. Standing Status:   Future     Standing Expiration Date:   5/3/2023     Order Specific Question:   Reason for exam:     Answer:   r/o labral tear     Order Specific Question:   What is the sedation requirement?      Answer:   Inga Osgood, MD, Orthopedic Surgery, [facility]     Referral Priority:   Routine     Referral Type:   Eval and Treat     Referral Reason:   Specialty Services Required     Referred to Provider:   Juanito Pablo Jean-Claude Fernández MD     Requested Specialty:   Orthopedic Surgery Sports Medicine     Number of Visits Requested:   1         Vandana Austin MD.      Roge Lux: \"This note was dictated with voice recognition software. Though review and correction are routine, we apologize for any errors. \"

## 2022-05-25 ENCOUNTER — OFFICE VISIT (OUTPATIENT)
Dept: ORTHOPEDIC SURGERY | Age: 52
End: 2022-05-25
Payer: COMMERCIAL

## 2022-05-25 VITALS — HEIGHT: 67 IN | WEIGHT: 117 LBS | BODY MASS INDEX: 18.36 KG/M2

## 2022-05-25 DIAGNOSIS — M75.121 NONTRAUMATIC COMPLETE TEAR OF RIGHT ROTATOR CUFF: Primary | ICD-10-CM

## 2022-05-25 PROCEDURE — 99203 OFFICE O/P NEW LOW 30 MIN: CPT | Performed by: ORTHOPAEDIC SURGERY

## 2022-05-26 NOTE — PROGRESS NOTES
Chief Complaint    New Patient (Right Shoulder)      History of Present Illness:  Octavio Romero is a 49 y.o. female who presents today on referral from Dr. Yuly Fermin for further evaluation and treatment of ongoing right shoulder problem. She has a long standing history of bilateral shoulder problem and has previously seen both Dr. Paulette Zaldivar and Dr. Ayla Powell. She grew up playing softball and volleyball, and she currently exercises regularly. She has not had any previous surgery but recalls a remote history of injection. More recently, she started experiencing worsening shoulder pain after moving a small fridge down some stairs. She has pain with cross body movements and she saw Dr. Brendan Alarcon. She has had a trial of nonoperative treatment without much benefit. She has previously had chiropractic treatment for her shoulder. Dr. Brendan Alarcon performed a diagnostic ultrasound that revealed a sizeable rotator cuff tear. He followed this up with an MRI that confirmed a full thickness rotator cuff tear. He recommended that she follow-up with me for consideration of definitive treatment. She is a  that works for the [employer]. She lives in [address] [place] [address] with her two teenage sons. Medical History:    Patient's medications, allergies, past medical, surgical, social and family histories were reviewed and updated as appropriate. Past Medical History:   Diagnosis Date    Allergic     Neuroma     Staph skin infection Oct 2012    knee      Social History     Socioeconomic History    Marital status:       Spouse name: Not on file    Number of children: Not on file    Years of education: Not on file    Highest education level: Not on file   Occupational History    Not on file   Tobacco Use    Smoking status: Never Smoker    Smokeless tobacco: Never Used    Tobacco comment: encouraged to never smoke    Substance and Sexual Activity    Alcohol use: Not Currently     Alcohol/week: 0.0 standard drinks    Drug use: No    Sexual activity: Not on file   Other Topics Concern    Not on file   Social History Narrative    Not on file     Social Determinants of Health     Financial Resource Strain:     Difficulty of Paying Living Expenses: Not on file   Food Insecurity:     Worried About Running Out of Food in the Last Year: Not on file    Dean of Food in the Last Year: Not on file   Transportation Needs:     Lack of Transportation (Medical): Not on file    Lack of Transportation (Non-Medical):  Not on file   Physical Activity: Sufficiently Active    Days of Exercise per Week: 6 days    Minutes of Exercise per Session: 40 min   Stress:     Feeling of Stress : Not on file   Social Connections:     Frequency of Communication with Friends and Family: Not on file    Frequency of Social Gatherings with Friends and Family: Not on file    Attends Cheondoism Services: Not on file    Active Member of Clubs or Organizations: Not on file    Attends Club or Organization Meetings: Not on file    Marital Status: Not on file   Intimate Partner Violence: Not At Risk    Fear of Current or Ex-Partner: No    Emotionally Abused: No    Physically Abused: No    Sexually Abused: No   Housing Stability:     Unable to Pay for Housing in the Last Year: Not on file    Number of [place] in the Last Year: Not on file    Unstable Housing in the Last Year: Not on file       Allergies   Allergen Reactions    Amoxicillin     Penicillins      Current Outpatient Medications on File Prior to Visit   Medication Sig Dispense Refill    diclofenac sodium (VOLTAREN) 1 % GEL Apply 2 to 4 g 3 times daily for 2 weeks then twice daily as needed thereafter 150 g 3    meloxicam (MOBIC) 15 MG tablet Take 1 tablet by mouth daily as needed for Pain 30 tablet 2    Cholecalciferol (VITAMIN D3) 50 MCG (2000 UT) CAPS Take by mouth      Magnesium Hydroxide (MAGNESIA PO) Take by mouth      IRON, FERROUS GLUCONATE, PO Take by mouth      Ascorbic Acid (VITAMIN C PO) Take by mouth (Patient not taking: Reported on 3/28/2022)      aspirin (ASPIRIN CHILDRENS) 81 MG chewable tablet 2 tablets by mouth daily for 21 days then 1 tablet daily after (Patient not taking: Reported on 3/28/2022) 60 tablet 0     Current Facility-Administered Medications on File Prior to Visit   Medication Dose Route Frequency Provider Last Rate Last Admin    methylPREDNISolone acetate (DEPO-MEDROL) injection 80 mg  80 mg Intra-artICUlar Once Navneet Bradley DO           Review of Systems:  Pertinent items are noted in HPI  Review of systems reviewed from Patient History Form dated on 4/27/2022 and available in the patient's chart under the Media tab. Vital Signs:  Vitals:       General Exam:   Constitutional: Patient is adequately groomed with no evidence of malnutrition  Mental Status: The patient is oriented to time, place and person. The patient's mood and affect are appropriate. Neurological: The patient has good coordination. There is no weakness or sensory deficit. She is thin but muscular/wiry. Right Shoulder Examination:    Inspection:  No atrophy or signs of acute trauma. Palpation:  Tender over the biceps and rotator cuff. Nontender over the [facility] joint. No subacromial crepitus. Active Range of Motion: 170/170/60/T6    Passive Range of Motion:  WNL. Cross body adduction painful at 5 cm. Strength:  4/5 Champagne. 4-/5 Preethi. 4+/5 ER, 5/5 IR. Negative subscapularis tests. Special Tests:  No Shailesh muscle deformity. Positive Preethi. Self assessment questionnaires were completed today. Radiology:     Plain radiographs of the right shoulder previously obtained were reviewed. Type II acromion. NO glenohumeral arthritis. Previous MRI of the right shoulder was also reviewed. Biceps tendinitis and full thickness supraspinatus rotator cuff tear, 1.3cm. NO muscle atrophy.          Assessment :  Rabia Araya Mik Sanchez has a medium sized full thickness rotator cuff tear. Strong consideration should be given for early surgical repair. Impression:  Encounter Diagnosis   Name Primary?  Nontraumatic complete tear of right rotator cuff Yes       Office Procedures:  No orders of the defined types were placed in this encounter. Treatment Plan:  We discussed pros and cons of operative vs. Non-operative treatment, in light of the recent MRI. She understands the benefits of arthroscopic repair and she would like to proceed with this. Risks, benefits and potential complications of arthroscopic shoulder surgery were discussed with the patient. Risks discussed include but are not limited to bleeding, infection, anesthetic risk, injury to nerves and blood vessels, deep vein thrombosis, residual stiffness and weakness, and the need for revision surgery. The patient also understands that anesthetic risks include cardiopulmonary issues, drug reactions and even death. The patient voices an understanding of the importance of physical therapy and home exercises after surgery. All questions were answered and written informed consent for surgery was obtained today. She will contact us to confirm a date for surgery and we will see her back preoperatively. She will be fitted with an UltraSling brace closer to the surgery date. She is in agreement with this plan and all of her questions were answered. João Ospina MD, PhD  5/25/2022

## 2022-05-27 ENCOUNTER — TELEPHONE (OUTPATIENT)
Dept: ORTHOPEDIC SURGERY | Age: 52
End: 2022-05-27

## 2022-05-27 NOTE — TELEPHONE ENCOUNTER
Other Patient would like a call back. Patient wants to understand post of protocol for driving limitaitons after surgery.  ph [phone]

## 2022-05-27 NOTE — TELEPHONE ENCOUNTER
General Question     Subject: PATIENT NEEDS TO UNDERSTAND DOCTOR'S POST-SURGERY PROTOCOL BEFORE SHE CAN SCHEDULE SURGERY BASED ON DRIVING LIMITATIONS.     Patient:  Shen David Number: [phone]

## 2022-05-31 ENCOUNTER — TELEPHONE (OUTPATIENT)
Dept: ORTHOPEDIC SURGERY | Age: 52
End: 2022-05-31

## 2022-05-31 NOTE — TELEPHONE ENCOUNTER
Surgery and/or Procedure Scheduling     Contact Name: Mary Madden Request:Sx on rt shoulder  Patient Contact Number: [phone]    The patient call she is ready to get schedule for sx. Please Advise.

## 2022-06-01 ENCOUNTER — TELEPHONE (OUTPATIENT)
Dept: ORTHOPEDIC SURGERY | Age: 52
End: 2022-06-01

## 2022-06-14 ENCOUNTER — OFFICE VISIT (OUTPATIENT)
Dept: ORTHOPEDIC SURGERY | Age: 52
End: 2022-06-14
Payer: COMMERCIAL

## 2022-06-14 VITALS — HEIGHT: 67 IN | BODY MASS INDEX: 18.36 KG/M2 | WEIGHT: 117 LBS

## 2022-06-14 DIAGNOSIS — M75.121 NONTRAUMATIC COMPLETE TEAR OF RIGHT ROTATOR CUFF: Primary | ICD-10-CM

## 2022-06-14 PROCEDURE — 99214 OFFICE O/P EST MOD 30 MIN: CPT | Performed by: ORTHOPAEDIC SURGERY

## 2022-06-14 PROCEDURE — L3670 SO ACRO/CLAV CAN WEB PRE OTS: HCPCS | Performed by: ORTHOPAEDIC SURGERY

## 2022-06-14 RX ORDER — GLUCOSAMINE/CHONDR SU A SOD 750-600 MG
TABLET ORAL
Status: ON HOLD | COMMUNITY
End: 2022-08-01

## 2022-06-14 NOTE — PROGRESS NOTES
Chief Complaint    Shoulder Pain (F/U RIGHT SHOULDER)      History of Present Illness:  Peng Mills is a pleasant, 49 y.o., female, here today for a pre-operative exam of her right shoulder. This patient has a full thickness tear of the rotator cuff and is on the surgery schedule for an arthroscopic repair on 8/1/22. She may be a candidate for one of our research studies - we will have our team discuss this with her today. She has had no real change in symptoms since previously evaluated. She has not yet been fitted with a sling. She reports no new injuries or setbacks. We discussed her social situation - her  passed away last year, however she has two teenagers who live at home with her and friends nearby who are willing to help during her postoperative period. Pain Assessment  Location of Pain: Shoulder  Location Modifiers: Right  Quality of Pain: Sharp,Dull,Aching  Duration of Pain: Persistent  Frequency of Pain: Intermittent  Aggravating Factors:  (certain movements, sleeping at night)  Limiting Behavior: Yes  Relieving Factors: Rest,Nsaids  Work-Related Injury: No  Are there other pain locations you wish to document?: No      Medical History:  Patient's medications, allergies, past medical, surgical, social and family histories were reviewed and updated as appropriate. Review of Systems  A 14 point review of systems was completed by the patient on 5/25/22 and is available in the media section of the scanned medical record and was reviewed on 6/14/2022. The review is negative with the exception of those things mentioned in the HPI and Past Medical History    Vital Signs: There were no vitals filed for this visit. General/Appearance: Alert and oriented and in no apparent distress. Skin:  There are no skin lesions, cellulitis, or extreme edema. The patient has warm and well-perfused Bilateral upper extremities with brisk capillary refill.       Right Shoulder Exam:  Inspection: No gross deformities, no signs of infection. Palpation: Tenderness over the rotator cuff    Active Range of Motion: Forward Elevation 170, Abduction 170, External Rotation 60, Internal Rotation T6    Passive Range of Motion: Deferred    Strength: Champagne Toast 4/5, Supraspinatus 4-/5    Special Tests: No Shailesh muscle deformity. Neurovascular: Sensation to light touch is intact, no motor deficits, palpable radial pulses 2+      Radiology:     No new XR obtained at this time. Assessment :  Claudette Brunette has a medium sized full thickness rotator cuff tear as demonstrated on MRI. This is a surgical lesion. Her prognosis is good. Impression:  Encounter Diagnosis   Name Primary?  Nontraumatic complete tear of right rotator cuff Yes       Office Procedures:  Orders Placed This Encounter   Procedures    DJO ultrasling IV Shoulder Sling     Patient was prescribed a DJO Ultrasling IV Shoulder Brace. The right shoulder will require stabilization / immobilization from this orthosis. The orthosis will assist in protecting the affected area, provide functional support and facilitate healing. The device was ordered and fit on 6/14/2022. The patient was educated and fit by a healthcare professional with expert knowledge and specialization in brace application while under the direct supervision of the treating physician. Verbal and written instructions for the use of and application of this item were provided. They were instructed to contact the office immediately should the brace result in increased pain, decreased sensation, increased swelling or worsening of the condition. Treatment Plan: We will proceed with surgery as planned on 8/1/22. We will go ahead and fit her with an ultrasling brace to be worn postoperatively. We discussed in detail risks, benefits and expectations postoperatively. We also discussed a recovery timeline following this operation.  She plans to work remotely for a period of time postoperatively. All questions regarding surgery and the recovery were addressed today in clinic. We will see Camille Disla back for surgery and/or as needed. All questions were answered to patient's satisfaction and She was encouraged to call with any further questions or concerns. Arsh Bundy is in agreement with this plan. Risks, benefits and potential complications of arthroscopic shoulder surgery were discussed with the patient. Risks discussed include but are not limited to bleeding, infection, anesthetic risk, injury to nerves and blood vessels, deep vein thrombosis, residual stiffness and weakness, and the need for revision surgery. The patient also understands that anesthetic risks include cardiopulmonary issues, drug reactions and even death. The patient voices an understanding of the importance of physical therapy and home exercises after surgery. All questions were answered and written informed consent for surgery was obtained today. The patient was counseled at length about the risks of az Covid-19 during their perioperative period and any recovery window from their procedure. The patient was made aware that az Covid-19  may worsen their prognosis for recovering from their procedure  and lend to a higher morbidity and/or mortality risk. All material risks, benefits, and reasonable alternatives including postponing the procedure were discussed. The patient does wish to proceed with the procedure at this time. 6/14/2022  4:46 PM    Aracely Soares ATC  [employer] [address]    During this examination, [place], functioned as a scribe for Dr. Anjum Castillo. The history taking and physical examination were performed by Dr. Bernadette Thomas. All counseling during the appointment was performed between the patient and Dr. Bernadette Thomas.  6/14/22  ______________  I, Dr. Anjum Castillo, personally performed the services described in this documentation as described by Yolis Reed ATC in my presence, and it is both accurate and complete. João Coelho MD, PhD  6/14/2022

## 2022-07-11 ENCOUNTER — TELEPHONE (OUTPATIENT)
Dept: ORTHOPEDIC SURGERY | Age: 52
End: 2022-07-11

## 2022-07-11 NOTE — TELEPHONE ENCOUNTER
Auth: NPR  Date: 08/01/22  Reference # [id number]  Spoke with: Online  Type of SX: Outpatient  Location: [facility]  CPT: 00789   DX: M75.121  SX area: Rt shoulder  Insurance: [id number] [place] [address]

## 2022-07-13 ENCOUNTER — TELEPHONE (OUTPATIENT)
Dept: ORTHOPEDIC SURGERY | Age: 52
End: 2022-07-13

## 2022-07-13 NOTE — TELEPHONE ENCOUNTER
General Question     Subject: Patient requesting a call to discuss FMLA paperwork.   Patient David Madsen Number: [phone]

## 2022-07-15 ENCOUNTER — OFFICE VISIT (OUTPATIENT)
Dept: FAMILY MEDICINE CLINIC | Age: 52
End: 2022-07-15
Payer: COMMERCIAL

## 2022-07-15 VITALS
OXYGEN SATURATION: 99 % | HEIGHT: 67 IN | DIASTOLIC BLOOD PRESSURE: 60 MMHG | HEART RATE: 70 BPM | BODY MASS INDEX: 18.21 KG/M2 | SYSTOLIC BLOOD PRESSURE: 84 MMHG | WEIGHT: 116 LBS

## 2022-07-15 DIAGNOSIS — Z01.818 PREOP EXAMINATION: Primary | ICD-10-CM

## 2022-07-15 DIAGNOSIS — M75.121 NONTRAUMATIC COMPLETE TEAR OF RIGHT ROTATOR CUFF: ICD-10-CM

## 2022-07-15 PROCEDURE — 99213 OFFICE O/P EST LOW 20 MIN: CPT | Performed by: FAMILY MEDICINE

## 2022-07-15 ASSESSMENT — ENCOUNTER SYMPTOMS
RESPIRATORY NEGATIVE: 1
GASTROINTESTINAL NEGATIVE: 1

## 2022-07-15 NOTE — LETTER
[facility]  [facility] [address] [facility],First Floor [address]  Phone: [phone]  Fax: [phone]    Tita Gore DO        July 15, 2022     Patient: Apple Pedro   YOB: 1970   Date of Visit: 7/15/2022       To Whom it May Concern:    Apple Pedro was seen in my clinic on 7/15/2022. If you have any questions or concerns, please don't hesitate to call.     Sincerely,         Tita Gore DO

## 2022-07-15 NOTE — PROGRESS NOTES
OUTPATIENT PROGRESS NOTE  Date of Service:  7/15/2022  Address: [facility]  [facility]  [address] [facility],First Floor [address]  Dept: [phone]  Loc: [phone]    Subjective:      Patient ID:  [id number]  Leonora Burrell is a [address] y.o. female     HPI  8/1/22  right shoulder arthroscopy  Dr JOSE A BUNCH [facility]  Review of Systems   Constitutional: Negative. HENT: Negative. Respiratory: Negative. Cardiovascular: Negative. Gastrointestinal: Negative. Musculoskeletal:  Positive for myalgias, neck pain and neck stiffness. Neurological: Negative. Psychiatric/Behavioral:  Positive for decreased concentration. Objective:   YOB: 1970    Date of Visit:  7/15/2022       Allergies   Allergen Reactions    Amoxicillin     Penicillins        Outpatient Medications Marked as Taking for the 7/15/22 encounter (Office Visit) with Conor Olsen, DO   Medication Sig Dispense Refill    meloxicam (MOBIC) 15 MG tablet Take 1 tablet by mouth daily as needed for Pain 30 tablet 2    Cholecalciferol (VITAMIN D3) 50 MCG (2000 UT) CAPS Take by mouth      Magnesium Hydroxide (MAGNESIA PO) Take by mouth      IRON, FERROUS GLUCONATE, PO Take by mouth         Vitals:    07/15/22 0813   BP: 84/60   Pulse: 70   SpO2: 99%   Weight: 116 lb (52.6 kg)   Height: 5' 7\" (1.702 m)     Body mass index is 18.17 kg/m².      Wt Readings from Last 3 Encounters:   07/15/22 116 lb (52.6 kg)   06/14/22 117 lb (53.1 kg)   05/25/22 117 lb (53.1 kg)     BP Readings from Last 3 Encounters:   07/15/22 84/60   03/28/22 110/70   09/28/21 92/68     Allergies   Allergen Reactions    Amoxicillin     Penicillins      Current Outpatient Medications   Medication Sig Dispense Refill    meloxicam (MOBIC) 15 MG tablet Take 1 tablet by mouth daily as needed for Pain 30 tablet 2    Cholecalciferol (VITAMIN D3) 50 MCG (2000 UT) CAPS Take by mouth      Magnesium Hydroxide (MAGNESIA PO) Take by mouth      IRON, FERROUS GLUCONATE, PO Take by mouth      Glucosamine HCl 1500 MG TABS Glucosamine Oral        active (Patient not taking: Reported on 7/15/2022)      diclofenac sodium (VOLTAREN) 1 % GEL Apply 2 to 4 g 3 times daily for 2 weeks then twice daily as needed thereafter (Patient not taking: Reported on 7/15/2022) 150 g 3     Current Facility-Administered Medications   Medication Dose Route Frequency Provider Last Rate Last Admin    methylPREDNISolone acetate (DEPO-MEDROL) injection 80 mg  80 mg Intra-artICUlar Once Hulon Pale, DO         Past Medical History:   Diagnosis Date    Allergic     Neuroma     Staph skin infection Oct 2012    knee     Past Surgical History:   Procedure Laterality Date    FINGER SURGERY  2012    remove infection    FOOT SURGERY      seasmoidectomy     Social History     Tobacco Use    Smoking status: Never    Smokeless tobacco: Never    Tobacco comments:     encouraged to never smoke    Substance Use Topics    Alcohol use: Not Currently     Alcohol/week: 0.0 standard drinks    Drug use: No     Family History   Problem Relation Age of Onset    Parkinsonism Mother     High Cholesterol Mother     Glaucoma Father     High Cholesterol Father     Glaucoma Brother         four brothers with glaucoma      She became hypotensive after foot surgery  Physical Exam  Vitals and nursing note reviewed. Constitutional:       Appearance: She is well-developed. HENT:      Head: Normocephalic. Neck:      Thyroid: No thyromegaly. Cardiovascular:      Rate and Rhythm: Normal rate and regular rhythm. Heart sounds: Normal heart sounds. Pulmonary:      Effort: Pulmonary effort is normal.      Breath sounds: Normal breath sounds. Abdominal:      General: There is no distension. Tenderness: There is no abdominal tenderness. Lymphadenopathy:      Cervical: No cervical adenopathy.    Neurological:      Mental Status: She is alert and oriented to person, place, and time.   Psychiatric:         Behavior: Behavior normal.         Thought Content:  Thought content normal.         Judgment: Judgment normal.          Assessment/Plan       Assessment/plan;  [employer] was seen today for pre-op exam.    Diagnoses and all orders for this visit:    Preop examination    Nontraumatic complete tear of right rotator cuff      Pt medically clear for surgery     Sinai Maldonado DO

## 2022-07-25 ENCOUNTER — TELEPHONE (OUTPATIENT)
Dept: FAMILY MEDICINE CLINIC | Age: 52
End: 2022-07-25

## 2022-07-25 NOTE — TELEPHONE ENCOUNTER
----- Message from Jessica Alvarez sent at 7/25/2022  9:50 AM EDT -----  Subject: Message to Provider    QUESTIONS  Information for Provider? Pt said she needs to get her blood work done   before her upcoming appt on 9/30. Please call and get her scheduled for   this appt.  ---------------------------------------------------------------------------  --------------  Sharon Comer INFO  [phone]; OK to leave message on voicemail  ---------------------------------------------------------------------------  --------------  SCRIPT ANSWERS  Relationship to Patient?  Self

## 2022-07-26 NOTE — PROGRESS NOTES
Place patient label inside box (if no patient label, complete below)  Name:  :  MR#:   Moon Kappa / PROCEDURE  I (we) Ceasar Villalobos (Patient Name) authorize Berenice Randolph (Provider / Patrick Areas) and/or such assistants as may be selected by him/her, to perform the following operation/procedure(s): RIGHT SHOULDER ARTHROSCOPY ROTATOR CUFF REPAIR       Note: If unable to obtain consent prior to an emergent procedure, document the emergent reason in the medical record. This procedure has been explained to my (our) satisfaction and included in the explanation was: The intended benefit, nature, and extent of the procedure to be performed; The significant risks involved and the probability of success; Alternative procedures and methods of treatment; The dangers and probable consequences of such alternatives (including no procedure or treatment); The expected consequences of the procedure on my future health; Whether other qualified individuals would be performing important surgical tasks and/or whether  would be present to advise or support the procedure. I (we) understand that there are other risks of infection and other serious complications in the pre-operative/procedural and postoperative/procedural stages of my (our) care. I (we) have asked all of the questions which I (we) thought were important in deciding whether or not to undergo treatment or diagnosis. These questions have been answered to my (our) satisfaction. I (we) understand that no assurance can be given that the procedure will be a success, and no guarantee or warranty of success has been given to me (us). It has been explained to me (us) that during the course of the operation/procedure, unforeseen conditions may be revealed that necessitate extension of the original procedure(s) or different procedure(s) than those set forth in Paragraph 1.  I (we) authorize and request that the above-named physician, his/her assistants or his/her designees, perform procedures as necessary and desirable if deemed to be in my (our) best interest.     Revised 8/2/2021                                                                          Page 1 of 2         I acknowledge that health care personnel may be observing this procedure for the purpose of medical education or other specified purposes as may be necessary as requested and/or approved by my (our) physician. I (we) consent to the disposal by the hospital Pathologist of the removed tissue, parts or organs in accordance with hospital policy. I do ____ do not ____ consent to the use of a local infiltration pain blocking agent that will be used by my provider/surgical provider to help alleviate pain during my procedure. I do ____ do not ____ consent to an emergent blood transfusion in the case of a life-threatening situation that requires blood components to be administered. This consent is valid for 24 hours from the beginning of the procedure. This patient does ____ or does not ____ currently have a DNR status/order. If DNR order is in place, obtain Addendum to the Surgical Consent for ALL Patients with a DNR Order to address flores-operative status for limited intervention or DNR suspension.      I have read and fully understand the above Consent for Operation/Procedure and that all blanks were completed before I signed the consent.   _____________________________       _____________________      ____/____am/pm  Signature of Patient or legal representative      Printed Name / Relationship            Date / Time   ____________________________       _____________________      ____/____am/pm  Witness to Signature                                    Printed Name                    Date / Time    If patient is unable to sign or is a minor, complete the following)  Patient is a minor, ____ years of age, or unable to sign because: ______________________________________________________________________________________________    If a phone consent is obtained, consent will be documented by using two health care professionals, each affirming that the consenting party has no questions and gives consent for the procedure discussed with the physician/provider.   _____________________          ____________________       _____/_____am/pm   2nd witness to phone consent        Printed name           Date / Time    Informed Consent:  I have provided the explanation described above in section 1 to the patient and/or legal representative.  I have provided the patient and/or legal representative with an opportunity to ask any questions about the proposed operation/procedure.   ___________________________          ____________________         ____/____am/pm  Provider / Proceduralist                            Printed name            Date / Time  Revised 8/2/2021                                                                      Page 2 of 2

## 2022-07-26 NOTE — PROGRESS NOTES
PRE-OP INSTRUCTIONS FOR THE SURGICAL PATIENT YOU ARE UNABLE TO MAKE CONTACT FOR AN INTERVIEW:        Follow all instructions provided to you from your surgeons office, including your ARRIVAL TIME. Enter the MAIN entrance located on [address] and report to the desk. Bring your insurance & photo ID with you. You may also be asked to pay a co-pay, as you may want to bring a check or credit card with you. Leave all other valuables at home. Arrange for someone to drive you home and be with you for the first 24 hours after discharge. Bring your medication list with you day of surgery with doses and frequency listed (including over the counter medications)  You must contact your surgeon for Instructions regarding:              - ALL medication instructions, especially if taking blood thinners, aspirin, or diabetic medication.         -Bariatric patients call surgeon if on diabetic medications as some need to be stopped 1 week preop  - IF  there is a change in your physical condition such as a cold, fever, rash, cuts, sores or any other infection, especially near your surgical site. A Pre-op History and Physical for surgery MUST be completed by your Physician or an Urgent Care within 30 days of your procedure date. Please bring a copy with you on the day of your procedure and along with any other testing performed. DO NOT EAT ANYTHING eight hours prior to arrival for surgery. May have up to 8 ounces of water 4 hours prior to arrival for surgery. NOTE: ALL Gastric, Bariatric and Bowel surgery patients MUST follow their surgeon's instructions. No gum, candy, mints, or ice chips day of procedure. Please refrain from drinking alcohol the day before or day of your procedure. Please do not smoke the day of your procedure. Dress in loose, comfortable clothing appropriate for redressing after your procedure.  Do not wear jewelry (including body piercings), make-up, fingernail polish,

## 2022-07-29 ENCOUNTER — ANESTHESIA EVENT (OUTPATIENT)
Dept: OPERATING ROOM | Age: 52
End: 2022-07-29
Payer: COMMERCIAL

## 2022-08-01 ENCOUNTER — HOSPITAL ENCOUNTER (OUTPATIENT)
Age: 52
Setting detail: OUTPATIENT SURGERY
Discharge: HOME | End: 2022-08-01
Attending: ORTHOPAEDIC SURGERY | Admitting: ORTHOPAEDIC SURGERY
Payer: COMMERCIAL

## 2022-08-01 ENCOUNTER — ANESTHESIA (OUTPATIENT)
Dept: OPERATING ROOM | Age: 52
End: 2022-08-01
Payer: COMMERCIAL

## 2022-08-01 VITALS
HEART RATE: 48 BPM | SYSTOLIC BLOOD PRESSURE: 104 MMHG | OXYGEN SATURATION: 98 % | WEIGHT: 114.4 LBS | HEIGHT: 68 IN | TEMPERATURE: 97.8 F | BODY MASS INDEX: 17.34 KG/M2 | RESPIRATION RATE: 14 BRPM | DIASTOLIC BLOOD PRESSURE: 66 MMHG

## 2022-08-01 DIAGNOSIS — M75.121 COMPLETE TEAR OF RIGHT ROTATOR CUFF, UNSPECIFIED WHETHER TRAUMATIC: Primary | ICD-10-CM

## 2022-08-01 LAB — PREGNANCY, URINE: NEGATIVE

## 2022-08-01 PROCEDURE — 2500000003 HC RX 250 WO HCPCS: Performed by: ANESTHESIOLOGY

## 2022-08-01 PROCEDURE — 2500000003 HC RX 250 WO HCPCS: Performed by: NURSE ANESTHETIST, CERTIFIED REGISTERED

## 2022-08-01 PROCEDURE — 64415 NJX AA&/STRD BRCH PLXS IMG: CPT | Performed by: ANESTHESIOLOGY

## 2022-08-01 PROCEDURE — 6360000002 HC RX W HCPCS: Performed by: ANESTHESIOLOGY

## 2022-08-01 PROCEDURE — C1713 ANCHOR/SCREW BN/BN,TIS/BN: HCPCS | Performed by: ORTHOPAEDIC SURGERY

## 2022-08-01 PROCEDURE — 7100000001 HC PACU RECOVERY - ADDTL 15 MIN: Performed by: ORTHOPAEDIC SURGERY

## 2022-08-01 PROCEDURE — 2500000003 HC RX 250 WO HCPCS: Performed by: ORTHOPAEDIC SURGERY

## 2022-08-01 PROCEDURE — 3700000001 HC ADD 15 MINUTES (ANESTHESIA): Performed by: ORTHOPAEDIC SURGERY

## 2022-08-01 PROCEDURE — 7100000011 HC PHASE II RECOVERY - ADDTL 15 MIN: Performed by: ORTHOPAEDIC SURGERY

## 2022-08-01 PROCEDURE — 3700000000 HC ANESTHESIA ATTENDED CARE: Performed by: ORTHOPAEDIC SURGERY

## 2022-08-01 PROCEDURE — 3600000004 HC SURGERY LEVEL 4 BASE: Performed by: ORTHOPAEDIC SURGERY

## 2022-08-01 PROCEDURE — 2580000003 HC RX 258: Performed by: ORTHOPAEDIC SURGERY

## 2022-08-01 PROCEDURE — 84703 CHORIONIC GONADOTROPIN ASSAY: CPT

## 2022-08-01 PROCEDURE — 6360000002 HC RX W HCPCS: Performed by: ORTHOPAEDIC SURGERY

## 2022-08-01 PROCEDURE — 7100000010 HC PHASE II RECOVERY - FIRST 15 MIN: Performed by: ORTHOPAEDIC SURGERY

## 2022-08-01 PROCEDURE — 7100000000 HC PACU RECOVERY - FIRST 15 MIN: Performed by: ORTHOPAEDIC SURGERY

## 2022-08-01 PROCEDURE — 2720000010 HC SURG SUPPLY STERILE: Performed by: ORTHOPAEDIC SURGERY

## 2022-08-01 PROCEDURE — 2580000003 HC RX 258: Performed by: ANESTHESIOLOGY

## 2022-08-01 PROCEDURE — C9290 INJ, BUPIVACAINE LIPOSOME: HCPCS | Performed by: ANESTHESIOLOGY

## 2022-08-01 PROCEDURE — 6360000002 HC RX W HCPCS: Performed by: NURSE ANESTHETIST, CERTIFIED REGISTERED

## 2022-08-01 PROCEDURE — 2709999900 HC NON-CHARGEABLE SUPPLY: Performed by: ORTHOPAEDIC SURGERY

## 2022-08-01 PROCEDURE — 3600000014 HC SURGERY LEVEL 4 ADDTL 15MIN: Performed by: ORTHOPAEDIC SURGERY

## 2022-08-01 DEVICE — SCREW INTRF L15MM DIA4.75MM W/ SZ 2 FIBERWIRE SUT AND DISP: Type: IMPLANTABLE DEVICE | Site: SHOULDER | Status: FUNCTIONAL

## 2022-08-01 DEVICE — ANCHOR SUTURE BIOCOMP 4.75X19.1 MM SWIVELOCK C: Type: IMPLANTABLE DEVICE | Site: SHOULDER | Status: FUNCTIONAL

## 2022-08-01 DEVICE — ANCHOR SUT L14.7MM DIA5.5MM BIOCOMPOSITE W/ 3 SZ 2: Type: IMPLANTABLE DEVICE | Site: SHOULDER | Status: FUNCTIONAL

## 2022-08-01 RX ORDER — ONDANSETRON 2 MG/ML
INJECTION INTRAMUSCULAR; INTRAVENOUS PRN
Status: DISCONTINUED | OUTPATIENT
Start: 2022-08-01 | End: 2022-08-01 | Stop reason: SDUPTHER

## 2022-08-01 RX ORDER — GLYCOPYRROLATE 0.2 MG/ML
INJECTION INTRAMUSCULAR; INTRAVENOUS PRN
Status: DISCONTINUED | OUTPATIENT
Start: 2022-08-01 | End: 2022-08-01 | Stop reason: SDUPTHER

## 2022-08-01 RX ORDER — FENTANYL CITRATE 50 UG/ML
INJECTION, SOLUTION INTRAMUSCULAR; INTRAVENOUS PRN
Status: DISCONTINUED | OUTPATIENT
Start: 2022-08-01 | End: 2022-08-01 | Stop reason: SDUPTHER

## 2022-08-01 RX ORDER — DOCUSATE SODIUM 100 MG/1
100 CAPSULE, LIQUID FILLED ORAL 2 TIMES DAILY
Qty: 60 CAPSULE | Refills: 0 | Status: SHIPPED | OUTPATIENT
Start: 2022-08-01 | End: 2022-08-31

## 2022-08-01 RX ORDER — BUPIVACAINE HYDROCHLORIDE 5 MG/ML
INJECTION, SOLUTION EPIDURAL; INTRACAUDAL
Status: COMPLETED
Start: 2022-08-01 | End: 2022-08-01

## 2022-08-01 RX ORDER — ONDANSETRON 4 MG/1
4 TABLET, ORALLY DISINTEGRATING ORAL 3 TIMES DAILY PRN
Qty: 21 TABLET | Refills: 0 | Status: SHIPPED | OUTPATIENT
Start: 2022-08-01 | End: 2022-09-18

## 2022-08-01 RX ORDER — FENTANYL CITRATE 50 UG/ML
INJECTION, SOLUTION INTRAMUSCULAR; INTRAVENOUS
Status: COMPLETED
Start: 2022-08-01 | End: 2022-08-01

## 2022-08-01 RX ORDER — SODIUM CHLORIDE 0.9 % (FLUSH) 0.9 %
5-40 SYRINGE (ML) INJECTION PRN
Status: DISCONTINUED | OUTPATIENT
Start: 2022-08-01 | End: 2022-08-01 | Stop reason: HOSPADM

## 2022-08-01 RX ORDER — SODIUM CHLORIDE 9 MG/ML
INJECTION, SOLUTION INTRAVENOUS PRN
Status: DISCONTINUED | OUTPATIENT
Start: 2022-08-01 | End: 2022-08-01 | Stop reason: HOSPADM

## 2022-08-01 RX ORDER — ONDANSETRON 2 MG/ML
4 INJECTION INTRAMUSCULAR; INTRAVENOUS
Status: COMPLETED | OUTPATIENT
Start: 2022-08-01 | End: 2022-08-01

## 2022-08-01 RX ORDER — OXYCODONE HYDROCHLORIDE AND ACETAMINOPHEN 5; 325 MG/1; MG/1
1 TABLET ORAL EVERY 6 HOURS PRN
Qty: 28 TABLET | Refills: 0 | Status: SHIPPED | OUTPATIENT
Start: 2022-08-01 | End: 2022-08-08

## 2022-08-01 RX ORDER — ROCURONIUM BROMIDE 10 MG/ML
INJECTION, SOLUTION INTRAVENOUS PRN
Status: DISCONTINUED | OUTPATIENT
Start: 2022-08-01 | End: 2022-08-01 | Stop reason: SDUPTHER

## 2022-08-01 RX ORDER — BUPIVACAINE HYDROCHLORIDE 5 MG/ML
INJECTION, SOLUTION EPIDURAL; INTRACAUDAL PRN
Status: DISCONTINUED | OUTPATIENT
Start: 2022-08-01 | End: 2022-08-01 | Stop reason: ALTCHOICE

## 2022-08-01 RX ORDER — MIDAZOLAM HYDROCHLORIDE 1 MG/ML
INJECTION INTRAMUSCULAR; INTRAVENOUS PRN
Status: DISCONTINUED | OUTPATIENT
Start: 2022-08-01 | End: 2022-08-01 | Stop reason: SDUPTHER

## 2022-08-01 RX ORDER — PROPOFOL 10 MG/ML
INJECTION, EMULSION INTRAVENOUS PRN
Status: DISCONTINUED | OUTPATIENT
Start: 2022-08-01 | End: 2022-08-01 | Stop reason: SDUPTHER

## 2022-08-01 RX ORDER — PROCHLORPERAZINE EDISYLATE 5 MG/ML
5 INJECTION INTRAMUSCULAR; INTRAVENOUS
Status: DISCONTINUED | OUTPATIENT
Start: 2022-08-01 | End: 2022-08-01 | Stop reason: HOSPADM

## 2022-08-01 RX ORDER — OXYCODONE HYDROCHLORIDE 5 MG/1
5 TABLET ORAL
Status: DISCONTINUED | OUTPATIENT
Start: 2022-08-01 | End: 2022-08-01 | Stop reason: HOSPADM

## 2022-08-01 RX ORDER — LABETALOL HYDROCHLORIDE 5 MG/ML
10 INJECTION, SOLUTION INTRAVENOUS
Status: DISCONTINUED | OUTPATIENT
Start: 2022-08-01 | End: 2022-08-01 | Stop reason: HOSPADM

## 2022-08-01 RX ORDER — HYDRALAZINE HYDROCHLORIDE 20 MG/ML
10 INJECTION INTRAMUSCULAR; INTRAVENOUS
Status: DISCONTINUED | OUTPATIENT
Start: 2022-08-01 | End: 2022-08-01 | Stop reason: HOSPADM

## 2022-08-01 RX ORDER — MEPERIDINE HYDROCHLORIDE 25 MG/ML
12.5 INJECTION INTRAMUSCULAR; INTRAVENOUS; SUBCUTANEOUS EVERY 5 MIN PRN
Status: DISCONTINUED | OUTPATIENT
Start: 2022-08-01 | End: 2022-08-01 | Stop reason: HOSPADM

## 2022-08-01 RX ORDER — LIDOCAINE HYDROCHLORIDE 20 MG/ML
INJECTION, SOLUTION EPIDURAL; INFILTRATION; INTRACAUDAL; PERINEURAL PRN
Status: DISCONTINUED | OUTPATIENT
Start: 2022-08-01 | End: 2022-08-01 | Stop reason: SDUPTHER

## 2022-08-01 RX ORDER — SENNA PLUS 8.6 MG/1
1 TABLET ORAL 2 TIMES DAILY
Qty: 60 TABLET | Refills: 11 | Status: SHIPPED | OUTPATIENT
Start: 2022-08-01 | End: 2022-09-18

## 2022-08-01 RX ORDER — DEXAMETHASONE SODIUM PHOSPHATE 4 MG/ML
INJECTION, SOLUTION INTRA-ARTICULAR; INTRALESIONAL; INTRAMUSCULAR; INTRAVENOUS; SOFT TISSUE PRN
Status: DISCONTINUED | OUTPATIENT
Start: 2022-08-01 | End: 2022-08-01 | Stop reason: SDUPTHER

## 2022-08-01 RX ORDER — SODIUM CHLORIDE 0.9 % (FLUSH) 0.9 %
5-40 SYRINGE (ML) INJECTION EVERY 12 HOURS SCHEDULED
Status: DISCONTINUED | OUTPATIENT
Start: 2022-08-01 | End: 2022-08-01 | Stop reason: HOSPADM

## 2022-08-01 RX ORDER — BUPIVACAINE HYDROCHLORIDE 5 MG/ML
INJECTION, SOLUTION EPIDURAL; INTRACAUDAL PRN
Status: DISCONTINUED | OUTPATIENT
Start: 2022-08-01 | End: 2022-08-01 | Stop reason: SDUPTHER

## 2022-08-01 RX ORDER — SODIUM CHLORIDE, SODIUM LACTATE, POTASSIUM CHLORIDE, CALCIUM CHLORIDE 600; 310; 30; 20 MG/100ML; MG/100ML; MG/100ML; MG/100ML
INJECTION, SOLUTION INTRAVENOUS CONTINUOUS
Status: DISCONTINUED | OUTPATIENT
Start: 2022-08-01 | End: 2022-08-01 | Stop reason: HOSPADM

## 2022-08-01 RX ORDER — MIDAZOLAM HYDROCHLORIDE 1 MG/ML
INJECTION INTRAMUSCULAR; INTRAVENOUS
Status: COMPLETED
Start: 2022-08-01 | End: 2022-08-01

## 2022-08-01 RX ADMIN — BUPIVACAINE 6 ML: 13.3 INJECTION, SUSPENSION, LIPOSOMAL INFILTRATION at 14:21

## 2022-08-01 RX ADMIN — FENTANYL CITRATE 50 MCG: 50 INJECTION, SOLUTION INTRAMUSCULAR; INTRAVENOUS at 16:12

## 2022-08-01 RX ADMIN — MIDAZOLAM HYDROCHLORIDE 2 MG: 2 INJECTION, SOLUTION INTRAMUSCULAR; INTRAVENOUS at 14:21

## 2022-08-01 RX ADMIN — ONDANSETRON 4 MG: 2 INJECTION INTRAMUSCULAR; INTRAVENOUS at 19:14

## 2022-08-01 RX ADMIN — GLYCOPYRROLATE 0.4 MG: 0.2 INJECTION INTRAMUSCULAR; INTRAVENOUS at 16:34

## 2022-08-01 RX ADMIN — ROCURONIUM BROMIDE 50 MG: 10 INJECTION INTRAVENOUS at 16:12

## 2022-08-01 RX ADMIN — LIDOCAINE HYDROCHLORIDE 50 MG: 20 INJECTION, SOLUTION EPIDURAL; INFILTRATION; INTRACAUDAL; PERINEURAL at 16:12

## 2022-08-01 RX ADMIN — ONDANSETRON 4 MG: 2 INJECTION INTRAMUSCULAR; INTRAVENOUS at 16:39

## 2022-08-01 RX ADMIN — VANCOMYCIN HYDROCHLORIDE 750 MG: 10 INJECTION, POWDER, LYOPHILIZED, FOR SOLUTION INTRAVENOUS at 16:07

## 2022-08-01 RX ADMIN — BUPIVACAINE HYDROCHLORIDE 6 ML: 5 INJECTION, SOLUTION EPIDURAL; INTRACAUDAL; PERINEURAL at 14:21

## 2022-08-01 RX ADMIN — PHENYLEPHRINE HYDROCHLORIDE 100 MCG: 10 INJECTION, SOLUTION INTRAMUSCULAR; INTRAVENOUS; SUBCUTANEOUS at 16:31

## 2022-08-01 RX ADMIN — DEXAMETHASONE SODIUM PHOSPHATE 4 MG: 4 INJECTION, SOLUTION INTRAMUSCULAR; INTRAVENOUS at 16:39

## 2022-08-01 RX ADMIN — PROPOFOL 170 MG: 10 INJECTION, EMULSION INTRAVENOUS at 16:12

## 2022-08-01 RX ADMIN — FENTANYL CITRATE 50 MCG: 50 INJECTION, SOLUTION INTRAMUSCULAR; INTRAVENOUS at 17:59

## 2022-08-01 RX ADMIN — SODIUM CHLORIDE, POTASSIUM CHLORIDE, SODIUM LACTATE AND CALCIUM CHLORIDE: 600; 310; 30; 20 INJECTION, SOLUTION INTRAVENOUS at 13:50

## 2022-08-01 RX ADMIN — GLYCOPYRROLATE 0.2 MG: 0.2 INJECTION INTRAMUSCULAR; INTRAVENOUS at 16:29

## 2022-08-01 RX ADMIN — ROCURONIUM BROMIDE 10 MG: 10 INJECTION INTRAVENOUS at 17:59

## 2022-08-01 ASSESSMENT — PAIN DESCRIPTION - DESCRIPTORS: DESCRIPTORS: DULL;ACHING

## 2022-08-01 ASSESSMENT — PAIN DESCRIPTION - PAIN TYPE: TYPE: CHRONIC PAIN

## 2022-08-01 ASSESSMENT — PAIN DESCRIPTION - ORIENTATION: ORIENTATION: RIGHT

## 2022-08-01 ASSESSMENT — PAIN DESCRIPTION - LOCATION: LOCATION: SHOULDER

## 2022-08-01 ASSESSMENT — PAIN DESCRIPTION - ONSET: ONSET: GRADUAL

## 2022-08-01 ASSESSMENT — PAIN DESCRIPTION - FREQUENCY: FREQUENCY: CONTINUOUS

## 2022-08-01 ASSESSMENT — PAIN SCALES - GENERAL: PAINLEVEL_OUTOF10: 2

## 2022-08-01 ASSESSMENT — PAIN - FUNCTIONAL ASSESSMENT: PAIN_FUNCTIONAL_ASSESSMENT: PREVENTS OR INTERFERES SOME ACTIVE ACTIVITIES AND ADLS

## 2022-08-01 NOTE — ANESTHESIA POSTPROCEDURE EVALUATION
Department of Anesthesiology  Postprocedure Note    Patient: Jovan Newton  MRN: [id number]  YOB: 1970  Date of evaluation: 8/1/2022      Procedure Summary     Date: 08/01/22 Room / Location: [address] [place] [address] [facility]    Anesthesia Start: 5528 Anesthesia Stop: 1853    Procedure: RIGHT SHOULDER EXAM UNDER ANESTHESIA, RIGH SHOULDER ARTHROSCOPIC DECOMPRESSION, ARTHROSCOPIC DEBRIDEMENT, ROTATOR CUFF REPAIR, OPEN BICEPS TENODESIS (Right: Shoulder) Diagnosis:       Nontraumatic complete tear of rotator cuff, right      (Nontraumatic complete tear of rotator cuff, right [M75.121])    Surgeons: Nikky Corley MD Responsible Provider: Paula Wilson MD    Anesthesia Type: general, regional ASA Status: 2          Anesthesia Type: No value filed.     Ward Phase I: Ward Score: 9    Ward Phase II:        Anesthesia Post Evaluation    Patient location during evaluation: PACU  Patient participation: complete - patient participated  Level of consciousness: awake and alert  Airway patency: patent  Nausea & Vomiting: no nausea and no vomiting  Complications: no  Cardiovascular status: hemodynamically stable  Respiratory status: acceptable  Hydration status: euvolemic  Multimodal analgesia pain management approach

## 2022-08-01 NOTE — PROGRESS NOTES
Asked if zofran helped - says \"maybe\". She is concerned that \"the beeps\" keep changing. (Hr changes - beep changes) Explained our HR is always changing. Tried to reassure everything is Ok. I am at bedside monitoring.

## 2022-08-01 NOTE — PROGRESS NOTES
Pt to \[facility]\"" for right shoulder surgery. Pt is alert; oriented X 4; speech clear; breathing easily on RA; walks with steady gait without assist; pt presents a flat affect and somewhat apathetic, but verbalizes anxiety regarding being \"tubed\" because   while on a ventilator per pt report. Dr. Po Calvo spent approx 20 to 25 minutes talking to pt about anesthesia, and gave pt the choice of continuing with procedure. Pt verbalized that \"I need to do it. \"  Dr. Po Calvo administered right interscalene block, and versed 2 mg IV given to pt, and pt tolerated all well. Exparel bracelet placed on pt's wrist with explanation of wearing for 4 days. Fentanyl 100 mcg was not given, and Dr. Po Calvo stated to either hand to CRNA when they arrive or waste the fentanyl, and same was placed in lockbox by this RN. Nilo Parra., RN took over care of pt and is aware that fentanyl is in lockbox and took over responsibility of medication. This RN called pt's brother Gabriel Thurman to update. He is currently not in the building, but will return. Vancomycin IVPB will go to OR with pt, along with black shoulder splint/sling/swathe. Pt has call light within reach.

## 2022-08-01 NOTE — ANESTHESIA PROCEDURE NOTES
Peripheral Block    Patient location during procedure: pre-op  Reason for block: procedure for pain, post-op pain management and at surgeon's request  Start time: 8/1/2022 2:18 PM  End time: 8/1/2022 2:22 PM  Staffing  Performed: anesthesiologist   Anesthesiologist: Sally Townsend,   Preanesthetic Checklist  Completed: patient identified, IV checked, site marked, risks and benefits discussed, surgical/procedural consents, equipment checked, pre-op evaluation, timeout performed, anesthesia consent given, oxygen available, monitors applied/VS acknowledged, fire risk safety assessment completed and verbalized and blood product R/B/A discussed and consented  Peripheral Block   Patient position: sitting  Prep: ChloraPrep  Provider prep: mask  Patient monitoring: cardiac monitor, continuous pulse ox, continuous capnometry, frequent blood pressure checks, IV access, oxygen and responsive to questions  Block type: Brachial plexus  Interscalene  Laterality: right  Injection technique: single-shot  Guidance: ultrasound guided    Needle   Needle type: insulated echogenic nerve stimulator needle   Needle localization: ultrasound guidance  Needle length: 5 cm  Assessment   Injection assessment: negative aspiration for heme, no paresthesia on injection, local visualized surrounding nerve on ultrasound and no intravascular symptoms  Paresthesia pain: none  Slow fractionated injection: yes  Hemodynamics: stable  Real-time US image taken/store: yes  Outcomes: uncomplicated and patient tolerated procedure well    Additional Notes  0.5% bupi-6mL  Exparel-6mL

## 2022-08-01 NOTE — PROGRESS NOTES
1850 Admitted to PACU from [facility] [address]. Connected to monitor. Report at bedside. On RA. Denies pain.

## 2022-08-01 NOTE — DISCHARGE INSTRUCTIONS
Maintain sling RUE, OK to remove for clothing changes and sponge baths  Strict NWB RUE  OK to remove dressings in 3 days   Pain control with prescribed Percocet  Follow up in office as scheduled     PERIPHERAL NERVE BLOCK INSTRUCTIONS     Please remember while having a nerve block you are at an increased risk for [address] were given a nerve block today from the anesthesiologist. Most nerve blocks last anywhere from 6-36 hours. You should start taking your pain medication before the block wears off or when you first begin feeling discomfort. It takes at least 30-60 minutes for a pain pill to take effect. Pain medications should be taken with food. Consider setting an alarm through the night to help manage your pain level so you do not wake up with too much pain. Pain medicines can cause more sedation and decrease your breathing so ONLY take as directed. If you have Sleep Apnea, you need to use your C-Pap machine. What to expect after a nerve block:    Numbness, tingling- affected area feels heavy or asleep   Weakness or inability to move or control your affected area   Inability to feel temperature changes to your affected area  Usually the weakness wears off first, followed by the tingling or heaviness. You may notice more pain at this point and should start taking your pain meds.    If you had a shoulder block, you may experience:   Mild shortness of breath (may be relieved by sitting up in a chair or recliner)   Hoarse voice   Blurry vision   Unequal pupils   Drooping of your face (eye or lip) on the same side as the nerve block   Swelling at the injection site on the side of your neck  These side effects should resolve as the block wears off   IF you have severe or prolonged shortness of breath- GO to the nearest Emergency Room   Protect the arm from extreme hot or cold temperatures   Protect the arm  from obstructing blood flow by frequent position changes- Make sure fingers stay pink and warm. Call surgeon with any changes   If you continue to feel the effects of the nerve block for longer than 72 hours- call [facility] at [phone] and ask to speak with the Anesthesiologist on call. Your Surgeon or Anesthesiologist gave you Exparel     Exparel (bupivicaine liposome injectable suspension) is a medication injected into the surgical incision  just before the end of the procedure by your surgeon to help control your pain after surgery. It can also be given as a nerve block by the anesthesiologist. This local anesthetic provides pain relief by numbing the tissue around the surgical site. Exparel releases pain medication over time lasting up to 5 days or 120 hours. Exparel may cause a temporary loss of sensation or the ability to move in the area where the medication was injected. Side effects of Exparel that may occur include nausea, vomiting or constipation. Call immediately if you experience numbness or tingling of the mouth or lips, lightheadedness or severe anxiety. Notify your physician if you experience these or any other possible side effects of the medication. Note: Other forms of bupivacaine should not be administered within 96 hours (4 days) following administration of Exparel. Please keep ID band in place for 96 hours (4 days). [facility]    There are potential side effects of anesthesia or sedation you may experience for the first 24 hours. These side effects include:    Confusion or Memory loss, Dizziness, or Delayed Reaction Times   [x]A responsible person should be with you for the next 24 hours. Do not operate any vehicles (automobiles, bicycles, motorcycles) or power tools or machinery for 24 hours. Do not sign any legal documents or make any legal decisions for 24 hours. Do not drink alcohol for 24 hours or while taking narcotic pain medication.       Nausea    [x]Start with light diet and progress to your normal diet as you feel like eating. However, if you experience nausea or repeated episodes of vomiting which persist beyond 12-24 hours, notify your physician. Once nausea has passed, remember to keep drinking fluids. Difficulty Passing Urine  [x]Drink extra amounts of fluid today. Notify your physician if you have not urinated within 8 hours after your procedure or you feel uncomfortable. Irritated Throat from a Breathing Tube  [x]Drink extra amounts of fluid today. Lozenges may help. Muscle Aches  [x]You may experience some generalized body aches as your muscles recover from medications used to relax them during surgery. These will gradually subside. MEDICATION INSTRUCTIONS:  [x]Prescription(S) x  4   sent with you. Use as directed. When taking pain medications, you may experience the side effect of dizziness or drowsiness. Do not drink alcohol or drive when taking these medications. []Prescription(S) x          Called to Pharmacy Name and location:    [x]Give the list of your medications to your primary care physician on your next visit. Keep your med list updated and carry it with in case of emergencies. [x] Narcotic pain medications can cause the side effect of significant constipation. You may want to add a stool softener to your postoperative medication schedule or speak to your surgeon on how best to manage this side effect. NARCOTIC SAFETY:  Your pain medicine is only for you to take. Safely store your medicines. Store pills up high and out of reach of children and pets. Ensure safety caps are snapped tightly  Keep track of how many pills you have left    Unused medication can be disposed of by taking them to a drop-off box or take-back program that is authorized by the [facility]. Access to a site near you can be found on the [facility] [employer] website ([address] [facility]).     If you have a CPAP machine, it is very important that you use it daily during all periods of sleep and daytime rest during your recovery at home. Surgery and Anesthesia place a significant amount of stress on your body. Using your CPAP will help keep you safe and lessen the negative effects of that stress. FOLLOW-UP RECOVERY CARE:  [x]Call the office at [phone] for follow-up appointment and problems    Watch for these possible complications, symptoms, or side effects of anesthesia. Call physician if they or any other problems occur:  Signs of INFECTION   > Fever over 101°     > Redness, swelling, hardness or warmth at the operative site   >Foul smelling or cloudy drainage at the operative site   Unrelieved PAIN  Unrelieved NAUSEA  Blood soaked dressing. (Some oozing may be normal)  Inability to urinate      Numb, pale, blue, cold or tingling extremity      Physician:  Dr Estrella Pino    The above instructions were reviewed with patient/significant other. The following additional patient specific information was reviewed with the patient/significant other:  [x]Procedure/physician specific instructions  [x]Please read Medication information sheet(S) including potential side effects that come with your meds  []Jemimas egress test  []Pain Ball management  []FAQ Catheter associated blood stream infections  [x]FAQ Surgical Site Infections  [x]Other-nerve block and exparel information    I have read and understand the instructions given to me: ____________________________________________   (Patient/S.O. Signature)            Date/time 8/1/2022 6:38 PM         PACU:  [phone]   M-F 700 AM - 7 PM      SAME DAY SERVICES:  [phone] M-F 7AM-6PM        If you smoke STOP. We care about your health! FAQs  (frequently asked questions)  About Surgical Site Infections     What is a Surgical Site Infection (SSI)? A surgical site infection is an infection that occurs after surgery in the part of the body   where the surgery took place.   Most patients who have surgery do not develop an infection. However, infections develop in about 1 to 3 out of every 100 patients who  have surgery. Some common symptoms of a surgical site infection are:   Redness and pain around the area where you had surgery  Drainage of cloudy fluid from your surgical wound   Fever     Can SSIs be treated? Yes. Most surgical site infections can be treated with antibiotics. The antibiotic given to  you depends on the bacteria (germs) causing the infection. Sometimes patients with  SSIs also need another surgery to treat the infection. What are some of the things that hospitals are doing to prevent SSIs? To prevent SSIs, doctors, nurses and other healthcare providers:   Clean their hands and arms up to their elbows with an antiseptic agent just before the surgery. Clean their hands with soap and water or an alcohol-based hand rub before and after caring for each patient. May remove some of your hair immediately before your surgery using electric clippers if the hair is in the same area where the procedure will occur. They should not shave you with a razor. Wear special hair covers, masks, gowns, and gloves during surgery to keep the surgery area clean. Give you antibiotics before your surgery starts. In most cases, you should get antibiotics within 60 minutes before the surgery starts and the antibiotics should be stopped within 24 hours after surgery. Clean the skin at the site of your surgery with a special soap that kills germs. What can I do to help prevent SSIs? Before your surgery:  Tell your doctor about other medical problems you may have. Health problems such as allergies, diabetes, and obesity could affect your surgery and your treatment. Quit smoking. Patients who smoke get more infections. Talk to your doctor about how you can quit before your surgery. Do not shave near where you will have surgery.   Shaving with a razor can irritate your skin and make it easier to develop an infection. At the time of your surgery:  Speak up if someone tries to shave you with a razor before surgery. Ask why you need to be shaved and talk with your surgeon if you have any concerns. Ask if you will get antibiotics before surgery. After your surgery:  Make sure that your healthcare providers clean their hands before examining you, either with soap and water or an alcohol-based hand rub. IF YOU DO NOT SEE YOUR PROVIDERS CLEAN THEIR HANDS, PLEASE ASK THEM TO DO SO. Family and friends who visit you should not touch the surgical wound or dressings. Family and friends should clean their hands with soap and water or an alcohol-based hand rub before and after visiting you. If you do not see them clean their hands, ask them to clean their hands. What do I need to do when I go home from the hospital?  Before you go home, your doctor nurses should explain everything you need to know about taking care of your wound. Make sure you understand how to care for your wound before you leave the hospital.    Always clean your hands before and after caring for your wound. Before you go home, make sure you know who to contact if you have questions or problems after you get home. If you have any symptoms of an infection, such as redness and pain at the surgery site, drainage, or fever, call your doctor immediately. If you have additional questions, please ask your doctor or nurse.

## 2022-08-01 NOTE — H&P
H&P Update     A hard copy history and physical was reviewed and is to be scanned into the chart. Date of Surgery Update:  Steven Opitz was seen and examined. There have been no significant clinical changes since the completion of the above History and Physical.  Patient identified by surgeon; surgical site was confirmed by patient and surgeon. ?  Signed By: Shimon Mclaughlin DO    ? August 1, 2022 4:16 PM    ?  ?

## 2022-08-01 NOTE — ANESTHESIA PRE PROCEDURE
Department of Anesthesiology  Preprocedure Note       Name:  Samuel Mancilla   Age:  49 y.o.  :  1970                                          MRN:  [id number]         Date:  2022      Surgeon: Meron Spencer):  Michelle Snell MD    Procedure: Procedure(s):  RIGHT SHOULDER ARTHROSCOPY, ROTATOR CUFF REPAIR    Medications prior to admission:   Prior to Admission medications    Medication Sig Start Date End Date Taking? Authorizing Provider   Glucosamine HCl 1500 MG TABS Glucosamine Oral        active  Patient not taking: Reported on 7/15/2022    Historical Provider, MD   diclofenac sodium (VOLTAREN) 1 % GEL Apply 2 to 4 g 3 times daily for 2 weeks then twice daily as needed thereafter  Patient not taking: Reported on 7/15/2022 4/27/22   Arelis Milian MD   meloxicam GEOVANY RAMIREZ [facility]) 15 MG tablet Take 1 tablet by mouth daily as needed for Pain 22   Arelis Milian MD   Cholecalciferol (VITAMIN D3) 50 MCG ( UT) CAPS Take by mouth    Historical Provider, MD   Magnesium Hydroxide (MAGNESIA PO) Take by mouth    Historical Provider, MD   IRON, FERROUS GLUCONATE, PO Take by mouth    Historical Provider, MD       Current medications:    No current facility-administered medications for this encounter. Allergies:     Allergies   Allergen Reactions    Amoxicillin     Penicillins        Problem List:    Patient Active Problem List   Diagnosis Code    Felon of middle finger of right hand L03.011    Arthritis of left acromioclavicular joint M19.012    Tendinitis of left rotator cuff M75.82    Chronic left shoulder pain M25.512, G89.29    Bursitis of shoulder M75.50    Partial tear of left rotator cuff M75.112    Closed Chapman's fracture of left thumb S62.212A    Partial hamstring tear, subsequent encounter S76.319D    Left proximal hamstring tendon rupture, subsequent encounter S76.312D       Past Medical History:        Diagnosis Date    Allergic     Neuroma     Staph skin infection Oct 2012    knee       Past Surgical History:        Procedure Laterality Date    FINGER SURGERY  2012    remove infection    FOOT SURGERY      seasmoidectomy       Social History:    Social History     Tobacco Use    Smoking status: Never    Smokeless tobacco: Never    Tobacco comments:     encouraged to never smoke    Substance Use Topics    Alcohol use: Not Currently     Alcohol/week: 0.0 standard drinks                                Counseling given: Not Answered  Tobacco comments: encouraged to never smoke       Vital Signs (Current):   Vitals:    07/29/22 2145 08/01/22 1212   BP:  103/70   Pulse:  61   Resp:  12   Temp:  98.3 °F (36.8 °C)   TempSrc:  Temporal   SpO2:  100%   Weight: 116 lb (52.6 kg) 114 lb 6.4 oz (51.9 kg)   Height:  5' 8\" (1.727 m)                                              BP Readings from Last 3 Encounters:   08/01/22 103/70   07/15/22 84/60   03/28/22 110/70       NPO Status:                                                                                 BMI:   Wt Readings from Last 3 Encounters:   08/01/22 114 lb 6.4 oz (51.9 kg)   07/15/22 116 lb (52.6 kg)   06/14/22 117 lb (53.1 kg)     Body mass index is 17.39 kg/m².     CBC:   Lab Results   Component Value Date/Time    WBC 2.5 09/21/2021 08:45 AM    RBC 4.33 09/21/2021 08:45 AM    RBC 4.28 10/02/2015 10:23 AM    HGB 13.8 09/21/2021 08:45 AM    HCT 40.5 09/21/2021 08:45 AM    MCV 93.6 09/21/2021 08:45 AM    RDW 13.8 09/21/2021 08:45 AM     09/21/2021 08:45 AM       CMP:   Lab Results   Component Value Date/Time     09/21/2021 08:45 AM    K 4.8 09/21/2021 08:45 AM     09/21/2021 08:45 AM    CO2 27 09/21/2021 08:45 AM    BUN 16 09/21/2021 08:45 AM    CREATININE 0.6 09/21/2021 08:45 AM    GFRAA >60 09/21/2021 08:45 AM    GFRAA >60 08/13/2012 09:50 AM    AGRATIO 2.2 09/21/2021 08:45 AM    LABGLOM >60 09/21/2021 08:45 AM    GLUCOSE 102 09/21/2021 08:45 AM    PROT 6.7 09/21/2021 08:45 AM    PROT 6.8 08/13/2012 09:50 AM    CALCIUM 9.6 09/21/2021 08:45 AM    BILITOT 0.4 09/21/2021 08:45 AM    ALKPHOS 70 09/21/2021 08:45 AM    AST 25 09/21/2021 08:45 AM    ALT 18 09/21/2021 08:45 AM       POC Tests: No results for input(s): POCGLU, POCNA, POCK, POCCL, POCBUN, POCHEMO, POCHCT in the last 72 hours. Coags: No results found for: PROTIME, INR, APTT    HCG (If Applicable): No results found for: PREGTESTUR, PREGSERUM, HCG, HCGQUANT     ABGs: No results found for: PHART, PO2ART, NUE5FRC, NCF9IUN, BEART, E7XDIYFP     Type & Screen (If Applicable):  No results found for: LABABO, LABRH    Drug/Infectious Status (If Applicable):  No results found for: HIV, HEPCAB    COVID-19 Screening (If Applicable): No results found for: COVID19        Anesthesia Evaluation  Patient summary reviewed and Nursing notes reviewed no history of anesthetic complications:   Airway: Mallampati: I  TM distance: >3 FB   Neck ROM: full  Mouth opening: > = 3 FB   Dental: normal exam         Pulmonary:Negative Pulmonary ROS and normal exam                               Cardiovascular:  Exercise tolerance: good (>4 METS),       (-)  angina, orthopnea and PND    ECG reviewed  Rhythm: regular  Rate: normal           Beta Blocker:  Not on Beta Blocker         Neuro/Psych:   Negative Neuro/Psych ROS     (-) neuromuscular disease           GI/Hepatic/Renal: Neg GI/Hepatic/Renal ROS            Endo/Other: Negative Endo/Other ROS                    Abdominal:         (-) obese Abdomen: soft. Vascular: negative vascular ROS. Other Findings:           Anesthesia Plan      general and regional     ASA 2       Induction: intravenous. MIPS: Postoperative opioids intended and Prophylactic antiemetics administered. Anesthetic plan and risks discussed with patient. Plan discussed with CRNA.                     Padmini Corrales DO   8/1/2022

## 2022-08-01 NOTE — PROGRESS NOTES
HR low - 40s. BP near pre-op level. Asked patient if has nausea, light headed or dizzy. Reports nausea vs hunger otherwise feels the same as pre-op. Zofran given.

## 2022-08-02 NOTE — OP NOTE
[address]                [address]                                OPERATIVE REPORT    PATIENT NAME: Sandra Messina                  :        1970  MED REC NO:   [id number]                          ROOM:  ACCOUNT NO:   [de-identified]                           ADMIT DATE: 2022  PROVIDER:     Oseas Sierra MD    DATE OF PROCEDURE:  2022    PREOPERATIVE DIAGNOSES:  Right shoulder impingement, chronic rotator  cuff tear, possible biceps tendon tear. POSTOPERATIVE DIAGNOSES:  Right shoulder impingement, chronic rotator  cuff tear, possible biceps tendon tear, high-grade intraarticular  glenoid tubercular biceps tendon tear. PROCEDURES:  Right shoulder examination under anesthesia; diagnostic  arthroscopy; arthroscopic extensive debridement of labrum, rotator cuff,  biceps, subacromial bursa, and rotator interval; arthroscopic  subacromial decompression with acromioplasty; arthroscopic rotator cuff  repair; open subpectoral biceps tenodesis. ANESTHESIA:  General anesthesia, interscalene block. IV FLUIDS:  1100 mL of crystalloids. ESTIMATED BLOOD LOSS:  25 mL. COMPLICATIONS:  None. SURGEON:  Oseas Sierra MD    ASSISTANT:  Jacquelin Mills DO    IMPLANTS:  Arthrex BioComposite Corkscrew anchor 5.5 fully threaded  anchors x2, Arthrex BioComposite SwiveLock 4.75 mm x2, Arthrex  BioComposite tenodesis screw 4.75 mm x1. FINDINGS:  Examination under anesthesia revealed no focal motion  deficits. No hyperlaxity. Diagnostic arthroscopy revealed obvious  biceps tendon tear and posterior interval full-thickness rotator cuff  tear. It appeared to be chronic with eburnated footprint. No  significant glenoid arthritis. There was some labral fraying anteriorly  and superiorly. There was some rotator interval scarring, all of this  was amenable to debridement.   Exploration of the subacromial space  revealed dense subdeltoid and subacromial bursitis and bursal adhesions. There was a type 2 acromion amenable to acromioplasty. There was small  ossicle of the acromion that had to be removed. These represented two  small loose bodies that were removed. There was a 2 cm front to back  full-thickness rotator cuff tear. There was no shortening, this was  amenable to transosseous equivalent repair to the lateral tuberosity. The biceps was very diseased with significant undermining and  partial-thickness tearing all the way down to biceps groove. This  required subpectoral tenodesis. There was a quite bit of tenosynovitis,  this was resected as well. BRIEF HISTORY AND PRESENTING ILLNESS:  As follows: The patient is a  67-year-old woman with longstanding history of anterior and lateral  right shoulder pain including pain at night and pain with overhead  lifting. She also had some weakness. MRI showed large rotator cuff  tear. Given her young age and desire to return and resume an active  lifestyle, recommended subacromial decompressive rotator cuff repair and  possible biceps tenodesis. She understood that concurrent lesion would  be addressed as encountered. She understood risks such as bleeding,  infection, anesthetic risks, injury to nerve and blood vessels,  stiffness, weakness, incomplete pain relief, and need for further  surgery. She understood all this and wished to proceed. She gave  informed consent. She was scheduled on an elective basis after  appropriate preoperative medical clearance. DESCRIPTION OF PROCEDURE:  On the date of the procedure, brought back to  the operating room, placed supine on the operating table. General  anesthesia was established. Preoperative antibiotics and interscalene  block were given in the holding area. Under anesthesia, examination was  carried out with findings as noted above. The patient was positioned  using [facility] beach-chair positioner in the sitting position. All  pressure points were padded. The patient's right shoulder and arm were  prepped and draped in a standard manner for arthroscopic shoulder  surgery. We used a Tenet Spider arm garcia to hold the arm in position. We began diagnostic arthroscopy. The arthroscope was introduced into  the glenohumeral joint through the standard posterolateral portal.   Systematic diagnostic arthroscopy findings as noted above. We  established an anterior portal within the rotator interval.  We used  arthroscopic shaver across the metal cannula. This was used to debride  some rotator interval scarring. We debrided some undersurface fraying  of the rotator cuff and also debrided some undersurface fraying of the  biceps tendon. It was quite degenerative and there was high-grade  partial tearing from as far as we can see all the way to the biceps  groove. We elected to proceed with tenotomy as our first step for  tenodesis. We percutaneously placed an 18-gauge spinal needle through  the biceps tendon, passed #1 PDS suture through that needle. We  withdrew the needle and retrieved the suture anteriorly. More medial to  that, we placed an upbiting basket to tenotomize the biceps tendon right  off the supraglenoid tubercle. We debrided the biceps stump using a  shaver, also debrided some superior labral fraying front to back. We  debrided some undersurface fraying of the rotator cuff with the shaver  and redirected the arthroscope through the same posterior portal above  the rotator cuff into the subacromial space. We established a lateral  portal under direct visualization and dilated the arthroscopic shaver  and performed a thorough bursectomy. Placed the arthroscope laterally  and completed bursectomy posterior and posterolaterally using a shaver  and cautery device. Cautery was used to resect the periosteum from the  undersurface of the acromion, gently teased off the coracoacromial  ligament.   This exposed a type 2 acromion. We also identified small  ossicle anteriorly, these were resected with the [place] grasper and  cautery as needed. We used acromionizer dior to perform our  acromioplasty. We smoothed out the undersurface, converted to flat type  1 acromion. We beveled the lateral edge of the acromion addressing the  outlet stenosis. We went back with the arthroscope posteriorly and the  dior from lateral to complete this. We also lightly abraded the rotator  cuff footprint. We did have to release some of the anterior cable  because there was a quite bit of tearing. We also exposed the bicipital  groove proximally. We could see the biceps was quite diseased. We got  the tagged suture out of the way through the accessory anterolateral  portal.  We could see the extent of the biceps disease. We lightly  abraded the footprint with a dior all the way lateral end of the  tuberosity and lightly rounded off the edge with the dior. We  established accessory anterolateral portal and posterolateral portal,  both were used for suture management. Through the anterolateral portal,  we placed our anteromedial corkscrew anchor against the articular margin  right behind the bicipital groove. This was loaded with three #2  FiberWire sutures. A similar anchor was placed posterolateral through  the posterior portal.  The PassPort cannula was placed laterally. Through that, we retrieved one limb of one of the sutures from the  posterior anchor. Loaded onto Arthrex FastPass Scorpion suture passer. We passed the suture through posterior medial aspect of the rotator cuff  through the muscle-tendon junction. We parked the suture tail  posterolaterally and repeated the process identically with the second  limb of that same suture and then both ends of the second suture were  passed. The third suture was not used and ultimately removed.   We had  two horizontal mattress suture through the posterior one and half to anterior cortex with  5.0 reamer to accommodate 4.75 BioComposite SwiveLock anchor. We placed  #2 FiberWire looped suture in a locking grasping fashion through the  biceps tendon starting at muscle-tendon junction. Five passes were made  in the tendon including all the diseased portion was excised. We tied a  mulberry knot at both ends of the suture exiting the tendon. We loaded  our BioComposite tenodesis screw and cannulated and inserted. A loop of  #2 Arthrex suture was placed and inserted. This allowed us to control  the tendon, so we could deliver into the bone window and secured it with  interference screw. We made sure the interference screw was flushed  with anterior cortical surface. It was stable on general tug testing. We tied the pair of sutures to reinforce the repair until left the tail  short. We irrigated copiously and closed the wound in layers. We used  3-0 Vicryl interrupted inverted fashion to close the subcutaneous  tissue. Routine skin closure with 4-0 Monocryl. Prineo dressing to  close the biceps incision as well as the arthroscopic portal,  anterolateral and posterior as well as two accessory portals with  anterolateral and posterolateral.  We infiltrated 0.5% Marcaine into the  biceps incision. Sterile compressive dressing was applied. The arm was  placed in a padded soft brace. The patient was repositioned in supine  position before this and promptly awakened from anesthesia having  tolerated the procedure well and taken from the operating room to the  recovery room in satisfactory condition. PLAN:  The patient will be discharged on oral analgesics with  instructions to begin outpatient physical therapy. Follow up with me in  the office in one week.         Landy Villafuerte MD    D: 08/01/2022 18:23:24       T: 08/01/2022 23:26:45     [email or web address]  Job#: [id number]     Doc#: [id number]    CC:

## 2022-08-02 NOTE — PROGRESS NOTES
Ambulatory Surgery/Procedure Discharge Note    Vitals:    08/01/22 2015   BP: 104/66   Pulse: (!) 48   Resp: 14   Temp:    SpO2: 98%       In: 171 [I.V.:171]  Out: -     Restroom use offered before discharge. Yes    Pain assessment:  none except sore throat. Discharge instructions reviewed with patient and her brother. All questions answered. Patient discharged to home/self care.  Patient discharged via wheel chair by transporter to waiting family/S.O.       8/1/2022 8:44 PM

## 2022-08-04 ENCOUNTER — TELEPHONE (OUTPATIENT)
Dept: ORTHOPEDIC SURGERY | Age: 52
End: 2022-08-04

## 2022-08-04 NOTE — TELEPHONE ENCOUNTER
Surgery and/or Procedure Scheduling     Contact Name: Blane Hunt  Surgical/Procedure Request: Sx on 8/1/22 rt shoulder  Patient Contact Number: [phone]      Patient call she need to know when can she get her dressing wet and she stated she removed her dressing and she just need to know if she did it correctly. Please Advise.

## 2022-08-09 ENCOUNTER — OFFICE VISIT (OUTPATIENT)
Dept: ORTHOPEDIC SURGERY | Age: 52
End: 2022-08-09

## 2022-08-09 VITALS — BODY MASS INDEX: 17.28 KG/M2 | HEIGHT: 68 IN | WEIGHT: 114 LBS

## 2022-08-09 DIAGNOSIS — Z98.890 S/P ARTHROSCOPY OF RIGHT SHOULDER: Primary | ICD-10-CM

## 2022-08-09 PROCEDURE — 99024 POSTOP FOLLOW-UP VISIT: CPT | Performed by: ORTHOPAEDIC SURGERY

## 2022-08-09 NOTE — LETTER
Shoulder Elbow Rehabilitation Referral    Patient Name: Divina Willett      YOB: 1970    Diagnosis:   1. S/P arthroscopy of right shoulder        Precautions: s/p bicep tenodesis and rotator cuff repair    Post Op Instructions:  [] Continuous passive motion (CPM)  [] Elbow range of motion  [] Exercise in plane of scapula   []  Strengthening     [] Pulley and instruction    [x] Home exercise program (copy to patient)   [] Sling when arm at risk  [] Sling or brace at all times   [] AAROM: Forward elevation to             [] AAROM: External rotation to     [] Isometric external rotator strengthening [] AAROM: internal rotation: up the back  [] Isometric abductor strengthening  [] AAROM: Internal abduction     [] Isometric internal rotator strengthening [] AAROM: cross-body adduction             Stretching:     Strengthening:  [] Four quadrant (FE, ER, IR, CBA)  [] Rotator cuff (ER, IR, Abd)  [] Forward Elevation    [] External Rotators     [] External Rotation    [] Internal Rotators  [] Internal Rotation: up/back   [] Abductors     [] Internal Rotation: supine in abduction  [] Flexors  [] Cross-body abduction    [] Extensors  [] Pendulum (FE, Abd/Add, cw/ccw)  [x] Scapular Stabilizers   [] Wall-walking (FE, Abd)    [x] Shoulder shrugs     [] Table slides      [x] Rhomboid pinch  [x] Elbow (flex, ext, pron, sup)    [] Lat.  Pull downs     [] Medial epicondylitis program    [] Forward punch   [] Lateral epicondylitis program    [] Internal rotators     [] Progressive resistive exercises  [] Bench Press        [] Bench press plus  Activities:     [] Lateral pull-downs  [] Rowing     [] Progressive two-hand supine press  [] Stepper/Exercise bike   [] Biceps: curls/supination  [] Swimming  [] Water exercises    Modalities: PRN    Return to Sport:  [] Ultrasound     [] Plyometrics  [] Iontophoresis     [] Rhythmic stabilization  [] Moist heat     [] Core strengthening   [] Massage     [] Sports specific program:   [x] Cryotherapy      [] Electrical stimulation     [] Paraffin  [] Whirlpool  [] TENS    [x] Home exercise program (copy to patient). Perform exercises for:   15     minutes    2-3      times/day  [x] Supervised physical therapy  Frequency: []  1x week  [x] 2x week  [] 3x week  [] Other:   Duration: [] 2 weeks   [] 4 weeks  [x] 6 weeks  [] Other:     Additional Instructions:           João Cintron MD, PhD

## 2022-08-09 NOTE — PROGRESS NOTES
History of Present Illness:  Samuel Mancilla is a pleasant 49 y.o. female who presents for a post operative visit. She is 1 week out following a  Right shoulder examination under anesthesia; diagnostic arthroscopy; arthroscopic extensive debridement of labrum, rotator cuff, biceps, subacromial bursa, and rotator interval; arthroscopic subacromial decompression with acromioplasty; arthroscopic rotator cuff repair; open subpectoral biceps tenodesis. Overall She is doing okay and feels that their pain is well controlled with current pain medications. She has been compliant with wearing the UltraSling brace at all times. She plans to do physical therapy at [facility]. She denies fevers, chills, numbness, tingling, and shortness of breath. Medical History:  Patient's medications, allergies, past medical, surgical, social and family histories were reviewed and updated as appropriate. No notes on file    Review of Systems  A 14 point review of systems was completed by the patient on 8/1/2022 and is available in the media section of the scanned medical record and was reviewed on 8/9/2022. Vital Signs: There were no vitals filed for this visit. General/Appearance: Alert and oriented and in no apparent distress. Skin:  There are no skin lesions, cellulitis, or extreme edema. The patient has warm and well-perfused Bilateral upper extremities with brisk capillary refill. R Shoulder Exam:    Inspection: Shoulder incision is clean, dry and intact and well approximated. The Prineo dressing is still in place. Mild ecchymosis and swelling are present as can be expected. There is no erythema, drainage or other signs of infection    Palpation:  No crepitus to gentle motion    Active Range of Motion: Deferred    Passive Range of Motion:  0-90    Strength:  Deferred    Special Tests:  Deferred.     Neurovascular: Sensation to light touch is intact, no motor deficits, palpable radial pulses 2+    Radiology:     No new XR obtained at this time. Assessment :  Ms. Poonam Brandt is a pleasant 49 y.o. patient who is 1 wk s/p right shoulder scope: subacromial decompression with acromioplasty; arthroscopic rotator cuff repair; open subpectoral biceps tenodesis. .        Impression:  Encounter Diagnosis   Name Primary? S/P arthroscopy of right shoulder Yes       Office Procedures:  Orders Placed This Encounter   Procedures    [facility]     Referral Priority:   Routine     Referral Type:   Eval and Treat     Referral Reason:   Specialty Services Required     Requested Specialty:   Physical Therapist     Number of Visits Requested:   1       Treatment Plan:    Overall Poonam Brandt is doing well. The pain is well-controlled. We recommend that She wear the UltraSling brace at all times with the exception of clothing, bathing and physical therapy. The patient was told that she is restricted from driving for at least 3 weeks postop. She should refrain from any biceps curls. All of her questions were fully answered today. We would like to see Poonam Brandt back in 2 weeks for follow-up visit. Poonam Brandt is in agreement with this plan. 8/9/2022  1:34 PM    Sonya More MD  Sports Medicine Fellow  [facility]    The encounter with Poonam Brandt was supervised by Dr. Alana Grant who personally examined the patient and reviewed the plan. This dictation was performed with a verbal recognition program (DRAGON) and it was checked for errors. It is possible that there are still dictated errors within this office note. If so, please bring any errors to my attention for an addendum. All efforts were made to ensure that this office note is accurate.   ______________________  I was physically present and personally supervised the Orthopaedic Sports Medicine Fellow in the evaluation and development of a treatment plan for this patient.  I personally interviewed the patient and performed a physical examination. In addition, I discussed the patient's condition and treatment options with them. I have also reviewed and agree with the past medical, family and social history unless otherwise noted. All of the patient's questions were answered. João Snell MD, PhD  8/9/2022

## 2022-08-09 NOTE — LETTER
[facility]  [address] [facility] [address]  Phone: [phone]  Fax: [phone]    Jerri Tracy MD        August 9, 2022     Patient: Christine Bowles   YOB: 1970   Date of Visit: 8/9/2022       To Whom It May Concern: It is my medical opinion that Vanessa Brennan was seen in the office today. If you have any questions or concerns, please don't hesitate to call.     Sincerely,          Jerri Tracy MD

## 2022-08-12 ENCOUNTER — HOSPITAL ENCOUNTER (OUTPATIENT)
Dept: PHYSICAL THERAPY | Age: 52
Setting detail: THERAPIES SERIES
Discharge: HOME | End: 2022-08-12
Payer: COMMERCIAL

## 2022-08-12 PROCEDURE — 97161 PT EVAL LOW COMPLEX 20 MIN: CPT

## 2022-08-12 PROCEDURE — 97530 THERAPEUTIC ACTIVITIES: CPT

## 2022-08-12 PROCEDURE — 97110 THERAPEUTIC EXERCISES: CPT

## 2022-08-12 NOTE — FLOWSHEET NOTE
[address] [place] [address], [address] [place] [address],[address]  Phone: [phone]   Fax: [phone]    Physical Therapy Treatment Note/ Progress Report:     Date:  2022    Patient Name:  Daniel Bennett    :  1970  MRN: [id number]  Restrictions/Precautions:    Medical/Treatment Diagnosis Information:  Diagnosis: Right shoulder impingement, chronic rotator  cuff tear, possible biceps tendon tear, high-grade intraarticular  glenoid tubercular biceps tendon tear; s/p RTC repair   Treatment Diagnosis: Decreased R Shoulder AROM/PROM as well as Strength Limiting ADLS/IADLS, limiting sleeping/driving  Insurance/Certification information:  PT Insurance Information: BCBS 60 visits per year; 20% copay; auth needed  Physician Information:  MD Dr. Swati Mendoza of care signed (Y/N): []  Yes [x]  No     Date of Patient follow up with Physician:      Progress Report: [x]  Yes Eval  []  No     Date Range for reporting period:  Beginnin2022  Ending:     Progress report due (10 Rx/or 30 days whichever is less): visit #10 or  7/3(NIJR)     Recertification due (POC duration/ or 90 days whichever is less): visit #30 or 11/10/2022 (date)     Visit # Insurance Allowable Auth required?  Date Range   Eval + ? TBD (60 visits allowed) [x]  Yes  []  No TBD       Units approved Units used Date Range   TBD TBD TBD     Latex Allergy:  [x]NO      []YES  Preferred Language for Healthcare:   [x]English       []other:    Functional Scale:           Date assessed:  FOTO physical FS primary measure score = 34; risk adjusted = 52  2022     Pain level:  1-2/10 Currently;     SUBJECTIVE:  See eval; patient did state last night almost fell on step and accidentally grabbed railing with her R hand which increased pain to 7/10    OBJECTIVE: See eval  Observation:   Test measurements:      RESTRICTIONS/PRECAUTIONS: Follow Rx/leeters for Dr. Radha Corrales protocol    Exercises/Interventions:   Therapeutic Exercise (78284) Resistance / level Sets / Seconds  Reps Notes/Cues   See HEP below X8'                                                                            Therapeutic Activities (84972)       8/12/22 Pt was educated on PT POC, Diagnosis, Prognosis, pathomechanics as well as frequency and duration of scheduling future physical therapy appointments. Time was also taken on this day to answer all patient questions and participation in [facility] 32'                                  Neuromuscular Re-ed (39990)                                          Manual Intervention ([phone])       Shld /GH Mobs       Post Cap mobs       Thoracic/Rib manipulation       CT MT/Mobs       PROM MT                  Modalities: 8/12: Supine CP to R GH joint X10' with towel support at elbow    Pt. Education:  -pt educated on diagnosis, prognosis and expectations for rehab  -all pt questions were answered    Home Exercise Program:  Access Code: [email or web address]  URL: [email or web address]  Date: 08/12/2022  Prepared by:  [facility]    Exercises  Seated Scapular Retraction - 2-3 x daily - 7 x weekly - 2-3 sets - 10 reps - 3\" hold  Seated Shoulder Shrug Circles AROM Backward - 2-3 x daily - 7 x weekly - 2-3 sets - 10 reps - 3\" hold  Seated Forearm Pronation and Supination AROM - 2-3 x daily - 7 x weekly - 2 sets - 10 reps  Wrist AROM Flexion Extension - 2-3 x daily - 7 x weekly - 1-2 sets - 10 reps  Supported Elbow Flexion Extension AROM - 2-3 x daily - 7 x weekly - 1-2 sets - 10 reps  Supine Elbow Extension Stretch in Supination - 2-3 x daily - 7 x weekly - 1 sets - 8-10 reps - 5-10\" hold      Therapeutic Exercise and NMR EXR  [x] (22515) Provided verbal/tactile cueing for activities related to strengthening, flexibility, endurance, ROM  for improvements in scapular, scapulothoracic and UE control with self care, reaching, carrying, lifting, house/yardwork, driving/computer work.    [] (20048) Provided verbal/tactile cueing for activities related to improving balance, coordination, kinesthetic sense, posture, motor skill, proprioception  to assist with  scapular, scapulothoracic and UE control with self care, reaching, carrying, lifting, house/yardwork, driving/computer work.  [] (59374) Therapist is in constant attendance of 2 or more patients providing skilled therapy interventions, but not providing any significant amount of measurable one-on-one time to either patient, for improvements in cervical, scapular, scapulothoracic and UE control with self care, reaching, carrying, lifting, house/yardwork, driving, computer work. Therapeutic Activities:    [x] (61916 or 19147) Provided verbal/tactile cueing for activities related to improving balance, coordination, kinesthetic sense, posture, motor skill, proprioception and motor activation to allow for proper function of scapular, scapulothoracic and UE control with self care, carrying, lifting, driving/computer work.      Home Exercise Program:    [x] (19319) Reviewed/Progressed HEP activities related to strengthening, flexibility, endurance, ROM of scapular, scapulothoracic and UE control with self care, reaching, carrying, lifting, house/yardwork, driving/computer work  [] (13309) Reviewed/Progressed HEP activities related to improving balance, coordination, kinesthetic sense, posture, motor skill, proprioception of scapular, scapulothoracic and UE control with self care, reaching, carrying, lifting, house/yardwork, driving/computer work      Manual Treatments:  PROM / STM / Oscillations-Mobs:  G-I, II, III, IV (PA's, Inf., Post.)  [x] (49733) Provided manual therapy to mobilize soft tissue/joints of cervical/CT, scapular GHJ and UE for the purpose of modulating pain, promoting relaxation,  increasing ROM, reducing/eliminating soft tissue swelling/inflammation/restriction, improving soft tissue extensibility and allowing for proper ROM for normal function with self care, reaching, carrying, lifting, house/yardwork, driving/computer work      Charges:  Timed Code Treatment Minutes: 25   Total Treatment Minutes: 50       [x] EVAL - LOW (35199)   [] EVAL - MOD (88630)  [] EVAL - HIGH (49263)  [] RE-EVAL (11355)  [x] IV(38223) x 1      [] Ionto  [] NMR (35519) x       [] Vaso  [] Manual (86661) x       [] Ultrasound  [x] TA x1        [] Mech Traction (43074)  [] Aquatic Therapy x     [] ES (un) (61392):   [] Home Management Training x  [] ES(attended) (43220)   [] Dry Needling 1-2 muscles (28626):  [] Dry Needling 3+ muscles (303944  [] Group:      [] Other:                    GOALS:   Patient stated goal: household maintenance, kickboxing, and strength training   [] Progressing: [] Met: [] Not Met: [] Adjusted    Therapist goals for Patient:   Short Term Goals: To be achieved in: 2 weeks  1. Independent in HEP and progression per patient tolerance, in order to prevent re-injury. [] Progressing: [] Met: [] Not Met: [] Adjusted  2. Patient will have a decrease in pain to facilitate improvement in movement, function, and ADLs as indicated by Functional Deficits. [] Progressing: [] Met: [] Not Met: [] Adjusted  3. Patient demonstrates understanding of and compliance with precautions and restrictions following surgery. [] Progressing: [] Met: [] Not Met: [] Adjusted    Long Term Goals: To be achieved in: 12-15 weeks  1. FOTO score of at least 65 to assist with reaching prior level of function including lifting medium size pots 5-10# overhead without increase pain. [] Progressing: [] Met: [] Not Met: [] Adjusted  2. Patient will demonstrate increased AROM of R GH to 95 % of L GH to allow for proper joint functioning as indicated by patients ability to perform ADLS without limitations by discharge. [] Progressing: [] Met: [] Not Met: [] Adjusted  3.  Patient will demonstrate an increase in Strength to 4-4.5/5 to allow for proper functional mobility as indicated by patients ability to reach into closets to retrieve items overhead and carry groceries without difficulty by discharge,  [] Progressing: [] Met: [] Not Met: [] Adjusted  4. Patient will return to functional activities including driving without increased symptoms or restriction. [] Progressing: [] Met: [] Not Met: [] Adjusted  5. Patient to return to strength training with weights following recommendations/precautions issued by PT and start \"punching\" motions without hitting bag by discharge. [] Progressing: [] Met: [] Not Met: [] Adjusted     Overall Progression Towards Functional goals/ Treatment Progress Update:  [] Patient is progressing as expected towards functional goals listed. [] Progression is slowed due to complexities/Impairments listed. [] Progression has been slowed due to co-morbidities. [x] Plan just implemented, too soon to assess goals progression <30days   [] Goals require adjustment due to lack of progress  [] Patient is not progressing as expected and requires additional follow up with physician  [] Other    Persisting Functional Limitations/Impairments:  []Sitting []Standing   []Transfers  []Sleeping   []Reaching []Lifting   []ADLs []Housework  []Driving []Job related tasks  []Sports/Recreation []Other:    ASSESSMENT:  See eval  Treatment/Activity Tolerance:  [x] Pt able to complete treatment [] Patient limited by fatique  [] Patient limited by pain  [] Patient limited by other medical complications  [] Other:     Prognosis:  [x] Good [] Fair  [] Poor    Patient Requires Follow-up: [x] Yes  [] No    Return to Play:    [x]  N/A     []  Stage 1: Intro to Strength   []  Stage 2: Dynamic Strength and Intro to Plyometrics   []  Stage 3: Advanced Plyometrics and Intro to Throwing   []  Stage 4: Sport specific Training/Return to Sport     []  Ready to Return to Play, [employer]   []  Not Ready for Return to Sports   Comments:      PLAN: See eval. PT 2x / week for 12-15 weeks.   [] Continue per plan of care [] Alter current plan (see comments)  [x] Plan of care initiated [] Hold pending MD visit [] Discharge    Electronically signed by: Donte Leyva PT, MSPT      Note: If patient does not return for scheduled/ recommended follow up visits, this note will serve as a discharge from care along with most recent update on progress.

## 2022-08-12 NOTE — PLAN OF CARE
Natana[place], [facility], [address]  Phone: [phone]   Fax: [phone]                                                     Physical Therapy Certification    Dear MD Dr. Mario Pryor,    We had the pleasure of evaluating the following patient for physical therapy services at [address] [place] [address]. A summary of our findings can be found in the initial assessment below. This includes our plan of care. If you have any questions or concerns regarding these findings, please do not hesitate to contact me at the office phone number checked above. Thank you for the referral.       Physician Signature:_______________________________Date:__________________  By signing above (or electronic signature), therapists plan is approved by physician      Patient: Poonam Speaker   : 1970   MRN: [id number]  Referring Physician: MD Dr. Mario Pryor      Evaluation Date: 2022      Medical Diagnosis Information:  Diagnosis: Right shoulder impingement, chronic rotator  cuff tear, possible biceps tendon tear, high-grade intraarticular  glenoid tubercular biceps tendon tear; s/p RTC repair    Treatment Diagnosis: Decreased R Shoulder AROM/PROM as well as Strength Limiting ADLS/IADLS, limiting sleeping/driving                                         Insurance information: PT Insurance Information: BCBS 60 visits per year; 20% copay; auth needed    Precautions/ Contra-indications: follow Dr. Alana Grant protocol  Latex Allergy:  [x]NO      []YES  Preferred Language for Healthcare:   [x]English       []Other:  Right shoulder impingement, chronic rotator  cuff tear, possible biceps tendon tear, high-grade intraarticular  glenoid tubercular biceps tendon tear.      PROCEDURES:  Right shoulder examination under anesthesia; diagnostic  arthroscopy; arthroscopic extensive debridement of labrum, rotator cuff,  biceps, subacromial bursa, and rotator interval; arthroscopic  subacromial decompression with acromioplasty; arthroscopic rotator cuff  repair; open subpectoral biceps tenodesis  C-SSRS Triggered by Intake questionnaire (Past 2 wk assessment ):   [x] No, Questionnaire did not trigger screening.   [] Yes, Patient intake triggered C-SSRS Screening     [] Completed, no further action required. [] Completed, PCP notified via Epic    SUBJECTIVE: Patient stated complaint: Patient reports post-op RTC repair and bicep tendonesis 8/1/2022. She presents to the clinic with a sling and bumper. She reports overall pain level is low but she did accidentally grab a railing with her R operated hand last night when almost falling off a step with pain increased to 7/10. Difficulty with ADLS/IADLS. 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 OBJECTIVE:   Hand dominance: R    Palpation: TTP t/o RTC/upper arm bicep/tricep    Functional Mobility/Transfers: mod I     Posture: Forward head/forward shoulder, R humeral head greater than 1/3 anterior to acromion    Bandages/Dressings/Incisions: Steristrips present. Incisions clean and dry with no S&S of infection.         Dermatomes Normal Abnormal Comments   Top of head (C1) X     Posterior occipital region (C2) X     Side of neck (C3) X     Top of shoulder (C4) X     Lateral deltoid (C5) X     Tip of thumb (C6) X     Distal middle finger (C7) X     Distal fifth finger (C8) X     Medial forearm (T1) x         Myotomes - NT due to recent surgery Normal Abnormal Comments   Neck flexion (C1-C2)      Neck sidebending (C3)      Shoulder elevation (C4)      Shoulder abduction (C5)      Elbow flexion/wrist extension (C6)      Elbow extension/wrist flexion (C7)      Thumb abduction (C8)      Finger abduction (T1)          Reflexes - NT due to recent surgery Normal Abnormal Comments   C5-6 Biceps      C5-6 Brachioradialis      C7-8 Triceps      Husains           PROM AROM    L R L R   Cervical Flexion        Cervical Extension        Cervical Rotation   [facility] [place] WFL   Cervical Side-bend   [facility] WFL   Shoulder Flexion  WFL 90     Shoulder Abduction  WFL 20 in scapular plane WFL NT due to sx   Shoulder External Rotation  WFL To neutral WFL NT due to sx   Shoulder Internal Rotation  WFL 50 in AB 20 awat WFL NT due to sx       Strength (0-5) - NT due to recent surgery Left Right    Shoulder Flex     Shoulder Abd     Shoulder ER     Shoulder IR     Biceps     Triceps                    Joint mobility: NT due to recent surgery   []Normal    []Hypo   []Hyper    Orthopedic Special Tests: NT due to recent surgery                                    [x] Patient history, allergies, meds reviewed. Medical chart reviewed. See intake form. Review Of Systems (ROS):  [x]Performed Review of systems (Integumentary, CardioPulmonary, Neurological) by intake and observation. Intake form has been scanned into medical record. Patient has been instructed to contact their primary care physician regarding ROS issues if not already being addressed at this time.       Co-morbidities/Complexities (which will affect course of rehabilitation):   []None        []Hx of COVID   Arthritic conditions   []Rheumatoid arthritis (M05.9)  []Osteoarthritis (M19.91)  []Gout   Cardiovascular conditions   []Hypertension (I10)  []Hyperlipidemia (E78.5)  []Angina pectoris (I20)  []Atherosclerosis (I70)  []Pacemaker  []Hx of CABG/stent/  cardiac surgeries   Musculoskeletal conditions   []Disc pathology   []Congenital spine pathologies   []Osteoporosis (M81.8)  []Osteopenia (M85.8)  []Scoliosis       Endocrine conditions   []Hypothyroid (E03.9)  []Hyperthyroid Gastrointestinal conditions   []Constipation (L27.40)   Metabolic conditions   []Morbid obesity (E66.01)  []Diabetes type 1(E10.65) or 2 (E11.65)   []Neuropathy (G60.9)     Cardio/Pulmonary conditions   []Asthma (J45)  []Coughing   []COPD (J44.9)  []CHF  []A-fib   Psychological Disorders  []Anxiety (F41.9)  []Depression (F32.9)   []Other:   Developmental Disorders  []Autism (F84.0)  []CP (G80)  []Down Syndrome (Q90.9)  []Developmental delay     Neurological conditions  []Prior Stroke (I69.30)  []Parkinson's (G20)  []Encephalopathy (G93.40)  []MS (G35)  []Post-polio (G14)  []SCI  []TBI  []ALS Other conditions  []Fibromyalgia (M79.7)  []Vertigo  []Syncope  []Kidney Failure  []Cancer      []currently undergoing                treatment  []Pregnancy  []Incontinence   Prior surgeries  []involved limb  []previous spinal surgery  [] section birth  []hysterectomy  []bowel / bladder surgery  []other relevant surgeries   [x]Other:   sesmoidectomy 2000; L foot OA/PRP           Barriers to/and or personal factors that will affect rehab potential:              []Age  []Sex    []Smoker              []Motivation/Lack of Motivation                        []Co-Morbidities              []Cognitive Function, education/learning barriers              []Environmental, home barriers              []profession/work barriers  []past PT/medical experience  []other:  Justification: NA     Falls Risk Assessment (30 days):  [x] Falls Risk assessed and no intervention required.   [] Falls Risk assessed and Patient requires intervention due to being higher risk   TUG score (>12s at risk):     [] Falls education provided, including         ASSESSMENT: See eval  Functional Impairments   []Noted spinal or UE joint hypomobility   []Noted spinal or UE joint hypermobility   [x]Decreased UE functional ROM   [x]Decreased UE functional strength   []Abnormal reflexes/sensation/myotomal/dermatomal deficits   [x]Decreased RC/scapular/core strength and neuromuscular control   []other:      Functional Activity Limitations (from functional questionnaire and intake)   [x]Reduced ability to tolerate prolonged functional positions   []Reduced ability or difficulty with changes of positions or transfers between positions   [x]Reduced ability to maintain good posture and demonstrate good body mechanics with sitting, bending, and lifting   [x] Reduced ability or tolerance with driving and/or computer work   [x]Reduced ability to sleep   [x]Reduced ability to perform lifting, reaching, carrying tasks   [x]Reduced ability to tolerate impact through UE   [x]Reduced ability to reach behind back   [x]Reduced ability to  or hold objects   [x]Reduced ability to throw or toss an object   []other:    Participation Restrictions   [x]Reduced participation in self care activities   [x]Reduced participation in home management activities   [x]Reduced participation in work activities   [x]Reduced participation in social activities. [x]Reduced participation in sport/recreation activities. Classification:   [x]Signs/symptoms consistent with post-surgical status including decreased ROM, strength and function.   []Signs/symptoms consistent with joint sprain/strain   []Signs/symptoms consistent with shoulder impingement   []Signs/symptoms consistent with shoulder/elbow/wrist tendinopathy   []Signs/symptoms consistent with Rotator cuff tear   []Signs/symptoms consistent with labral tear   []Signs/symptoms consistent with postural dysfunction    []Signs/symptoms consistent with Glenohumeral IR Deficit - <45 degrees   []Signs/symptoms consistent with facet dysfunction of cervical/thoracic spine    []Signs/symptoms consistent with pathology which may benefit from Dry needling     []other:     Prognosis/Rehab Potential:      []Excellent   [x]Good    []Fair   []Poor    Tolerance of evaluation/treatment:    []Excellent   [x]Good    []Fair   []Poor    Physical Therapy Evaluation Complexity Justification  [x] A history of present problem with:  [] no personal factors and/or comorbidities that impact the plan of care;  []1-2 personal factors and/or comorbidities that impact the plan of care  []3 personal factors and/or comorbidities that impact the plan of care  [x] An examination of body systems using standardized tests and measures addressing any of the following: body structures and functions (impairments), activity limitations, and/or participation restrictions;:  [] a total of 1-2 or more elements   [] a total of 3 or more elements   [] a total of 4 or more elements   [x] A clinical presentation with:  [] stable and/or uncomplicated characteristics   [] evolving clinical presentation with changing characteristics  [] unstable and unpredictable characteristics;   [x] Clinical decision making of [x] low, [] moderate, [] high complexity using standardized patient assessment instrument and/or measurable assessment of functional outcome. [x] EVAL (LOW) 89445 (typically 15 minutes face-to-face)  [] EVAL (MOD) 32366 (typically 30 minutes face-to-face)  [] EVAL (HIGH) 32107 (typically 45 minutes face-to-face)  [] RE-EVAL     PLAN:  Frequency/Duration:  2 days per week for 12-15 Weeks:  INTERVENTIONS:  [x] Therapeutic exercise including: strength training, ROM, for Upper extremity and core   [x]  NMR activation and proprioception for UE, scap and Core   [x] Manual therapy as indicated for shoulder, scapula and spine to include: Dry Needling/IASTM, STM, PROM, Gr I-IV mobilizations, manipulation.    [x] Modalities as needed that may include: thermal agents, E-stim, Biofeedback, US, iontophoresis as indicated  [x] Patient education on joint protection, postural re-education, activity modification, progression of HEP. HEP instruction: Pt provided with written HEP instructions. GOALS:  Patient stated goal: household maintenance, kickboxing, and strength training   [] Progressing: [] Met: [] Not Met: [] Adjusted    Therapist goals for Patient:   Short Term Goals: To be achieved in: 2 weeks  1. Independent in HEP and progression per patient tolerance, in order to prevent re-injury. [] Progressing: [] Met: [] Not Met: [] Adjusted  2. Patient will have a decrease in pain to facilitate improvement in movement, function, and ADLs as indicated by Functional Deficits. [] Progressing: [] Met: [] Not Met: [] Adjusted  3. Patient demonstrates understanding of and compliance with precautions and restrictions following surgery. [] Progressing: [] Met: [] Not Met: [] Adjusted    Long Term Goals: To be achieved in: 12-15 weeks  1. FOTO score of at least 65 to assist with reaching prior level of function including lifting medium size pots 5-10# overhead without increase pain. [] Progressing: [] Met: [] Not Met: [] Adjusted  2. Patient will demonstrate increased AROM of R GH to 95 % of L GH to allow for proper joint functioning as indicated by patients ability to perform ADLS without limitations by discharge. [] Progressing: [] Met: [] Not Met: [] Adjusted  3. Patient will demonstrate an increase in Strength to 4-4.5/5 to allow for proper functional mobility as indicated by patients ability to reach into closets to retrieve items overhead and carry groceries without difficulty by discharge,  [] Progressing: [] Met: [] Not Met: [] Adjusted  4. Patient will return to functional activities including driving without increased symptoms or restriction. [] Progressing: [] Met: [] Not Met: [] Adjusted  5.  Patient to return to strength training with weights following recommendations/precautions issued by PT and start \"punching\" motions without hitting bag by discharge.   [] Progressing: [] Met: [] Not Met: [] Adjusted     Electronically signed by:  Sean Muller PT

## 2022-08-16 ENCOUNTER — HOSPITAL ENCOUNTER (OUTPATIENT)
Dept: PHYSICAL THERAPY | Age: 52
Setting detail: THERAPIES SERIES
Discharge: HOME | End: 2022-08-16
Payer: COMMERCIAL

## 2022-08-16 PROCEDURE — 97140 MANUAL THERAPY 1/> REGIONS: CPT | Performed by: SPECIALIST/TECHNOLOGIST

## 2022-08-16 PROCEDURE — 97110 THERAPEUTIC EXERCISES: CPT | Performed by: SPECIALIST/TECHNOLOGIST

## 2022-08-16 PROCEDURE — 97016 VASOPNEUMATIC DEVICE THERAPY: CPT | Performed by: SPECIALIST/TECHNOLOGIST

## 2022-08-16 NOTE — FLOWSHEET NOTE
OBJECTIVE:   Observation:   Test measurements:      RESTRICTIONS/PRECAUTIONS: Follow Rx/leeters for Dr. Merton Spatz protocol    Exercises/Interventions:  25'  Therapeutic Exercise (24104) Resistance / level Sets / Obdulia Jeffers Notes/Cues   See HEP below X8'      Pendulums  [address]   8/16   Cane ER     8/16                                                           Therapeutic Activities (13294)       8/12/22 Pt was educated on PT POC, Diagnosis, Prognosis, pathomechanics as well as frequency and duration of scheduling future physical therapy appointments. Time was also taken on this day to answer all patient questions and participation in [address]   8/16 updated HEP                               Neuromuscular Re-ed (87207)                                          Manual  Intervention (71584)  15'       Shld /GH Mobs 5'   Grade 1  8/16   Post Cap mobs       Thoracic/Rib manipulation       CT MT/Mobs       PROM MT 10'   8/16              Modalities: 8/12: Supine CP to R [address] [facility] [address] joint X10' with towel support at elbow    Pt. Education:  -pt educated on diagnosis, prognosis and expectations for rehab  -all pt questions were answered    Home Exercise Program:  Access Code: [email or web address]  URL: [email or web address]  Date: 08/12/2022  Prepared by:  [facility]-JUSTIN    Exercises  Seated Scapular Retraction - 2-3 x daily - 7 x weekly - 2-3 sets - 10 reps - 3\" hold  Seated Shoulder Shrug Circles AROM Backward - 2-3 x daily - 7 x weekly - 2-3 sets - 10 reps - 3\" hold  Seated Forearm Pronation and Supination AROM - 2-3 x daily - 7 x weekly - 2 sets - 10 reps  Wrist AROM Flexion Extension - 2-3 x daily - 7 x weekly - 1-2 sets - 10 reps  Supported Elbow Flexion Extension AROM - 2-3 x daily - 7 x weekly - 1-2 sets - 10 reps  Supine Elbow Extension Stretch in Supination - 2-3 x daily - 7 x weekly - 1 sets - 8-10 reps - 5-10\" hold      Therapeutic Exercise and NMR EXR  [x] (99779) Provided verbal/tactile cueing for activities related to strengthening, flexibility, endurance, ROM  for improvements in scapular, scapulothoracic and UE control with self care, reaching, carrying, lifting, house/yardwork, driving/computer work.    [] (33884) Provided verbal/tactile cueing for activities related to improving balance, coordination, kinesthetic sense, posture, motor skill, proprioception  to assist with  scapular, scapulothoracic and UE control with self care, reaching, carrying, lifting, house/yardwork, driving/computer work.  [] (88946) Therapist is in constant attendance of 2 or more patients providing skilled therapy interventions, but not providing any significant amount of measurable one-on-one time to either patient, for improvements in cervical, scapular, scapulothoracic and UE control with self care, reaching, carrying, lifting, house/yardwork, driving, computer work. Therapeutic Activities:    [x] (13641 or 60258) Provided verbal/tactile cueing for activities related to improving balance, coordination, kinesthetic sense, posture, motor skill, proprioception and motor activation to allow for proper function of scapular, scapulothoracic and UE control with self care, carrying, lifting, driving/computer work.      Home Exercise Program:    [x] (61134) Reviewed/Progressed HEP activities related to strengthening, flexibility, endurance, ROM of scapular, scapulothoracic and UE control with self care, reaching, carrying, lifting, house/yardwork, driving/computer work  [] (02881) Reviewed/Progressed HEP activities related to improving balance, coordination, kinesthetic sense, posture, motor skill, proprioception of scapular, scapulothoracic and UE control with self care, reaching, carrying, lifting, house/yardwork, driving/computer work      Manual Treatments:  PROM / STM / Oscillations-Mobs:  G-I, II, III, IV (PA's, Inf., Post.)  [x] (52936) Provided manual therapy to mobilize soft tissue/joints of cervical/CT, scapular GHJ and UE for the purpose of modulating pain, promoting relaxation,  increasing ROM, reducing/eliminating soft tissue swelling/inflammation/restriction, improving soft tissue extensibility and allowing for proper ROM for normal function with self care, reaching, carrying, lifting, house/yardwork, driving/computer work      Charges:  Timed Code Treatment Minutes: 25   Total Treatment Minutes: 40       [] EVAL - LOW (90151)   [] EVAL - MOD (64587)  [] EVAL - HIGH (25074)  [] RE-EVAL (43582)  [x] XI(16797) x 1      [] Ionto  [] NMR (28072) x       [x] Vaso  [x] Manual (78623) x  1     [] Ultrasound  [] TA x        [] Mech Traction (76507)  [] Aquatic Therapy x     [] ES (un) (16492):   [] Home Management Training x  [] ES(attended) (50715)   [] Dry Needling 1-2 muscles (40970):  [] Dry Needling 3+ muscles (691183  [] Group:      [] Other:                    GOALS:   Patient stated goal: household maintenance, kickboxing, and strength training   [] Progressing: [] Met: [] Not Met: [] Adjusted    Therapist goals for Patient:   Short Term Goals: To be achieved in: 2 weeks  1. Independent in HEP and progression per patient tolerance, in order to prevent re-injury. [] Progressing: [] Met: [] Not Met: [] Adjusted  2. Patient will have a decrease in pain to facilitate improvement in movement, function, and ADLs as indicated by Functional Deficits. [] Progressing: [] Met: [] Not Met: [] Adjusted  3. Patient demonstrates understanding of and compliance with precautions and restrictions following surgery. [] Progressing: [] Met: [] Not Met: [] Adjusted    Long Term Goals: To be achieved in: 12-15 weeks  1. FOTO score of at least 65 to assist with reaching prior level of function including lifting medium size pots 5-10# overhead without increase pain. [] Progressing: [] Met: [] Not Met: [] Adjusted  2.  Patient will demonstrate increased AROM of R GH to 95 % of L GH to allow for proper joint functioning as indicated by patients ability to perform ADLS without limitations by discharge. [] Progressing: [] Met: [] Not Met: [] Adjusted  3. Patient will demonstrate an increase in Strength to 4-4.5/5 to allow for proper functional mobility as indicated by patients ability to reach into closets to retrieve items overhead and carry groceries without difficulty by discharge,  [] Progressing: [] Met: [] Not Met: [] Adjusted  4. Patient will return to functional activities including driving without increased symptoms or restriction. [] Progressing: [] Met: [] Not Met: [] Adjusted  5. Patient to return to strength training with weights following recommendations/precautions issued by PT and start \"punching\" motions without hitting bag by discharge. [] Progressing: [] Met: [] Not Met: [] Adjusted     Overall Progression Towards Functional goals/ Treatment Progress Update:  [] Patient is progressing as expected towards functional goals listed. [] Progression is slowed due to complexities/Impairments listed. [] Progression has been slowed due to co-morbidities. [x] Plan just implemented, too soon to assess goals progression <30days   [] Goals require adjustment due to lack of progress  [] Patient is not progressing as expected and requires additional follow up with physician  [] Other    Persisting Functional Limitations/Impairments:  []Sitting []Standing   []Transfers  []Sleeping   []Reaching []Lifting   []ADLs []Housework  []Driving []Job related tasks  []Sports/Recreation []Other:    ASSESSMENT:  Pt had some increased soreness over the distal biceps where ecchymosis is present and over the distal wrist with PROM. Pt has appropriate PROM for 2+weeks s/p. Pt needs verbal cues to correct posture but is working on this daily with sitting on her computer. Pt tolerated progressions with ER cane and pendulums today with no issues.    Treatment/Activity Tolerance:  [x] Pt able to complete treatment [] Patient limited by fatique  [] Patient limited by pain  [] Patient limited by other medical complications  [] Other:     Prognosis:  [x] Good [] Fair  [] Poor    Patient Requires Follow-up: [x] Yes  [] No    Return to Play:    [x]  N/A     []  Stage 1: Intro to Strength   []  Stage 2: Dynamic Strength and Intro to Plyometrics   []  Stage 3: Advanced Plyometrics and Intro to Throwing   []  Stage 4: Sport specific Training/Return to Sport     []  Ready to Return to Play, [employer]   []  Not Ready for Return to Sports   Comments:      PLAN: PT 2x / week for 12-15 weeks. [x] Continue per plan of care [] Alter current plan (see comments)  [] Plan of care initiated [] Hold pending MD visit [] Discharge    Electronically signed by: Crystal Bowie, PTA, [id number]    Note: If patient does not return for scheduled/ recommended follow up visits, this note will serve as a discharge from care along with most recent update on progress.

## 2022-08-17 ENCOUNTER — TELEPHONE (OUTPATIENT)
Dept: ORTHOPEDIC SURGERY | Age: 52
End: 2022-08-17

## 2022-08-17 NOTE — TELEPHONE ENCOUNTER
General Question     Subject: PATIENT HAS A FEW QUESTIONS ABOUT PHYSICAL THERAPY  Patient and /or Facility Request: Sabina Tracy  Contact Number:  [phone]    PATIENT NEEDS TO SPEAK WITH THE OFFICE ABOUT OUT-PATIENT PHYSICAL THERAPY THEY ARE SAYING ITS NOT COVERED BY HER INSURANCE. PLEASE GIVE PATENT A CALL AT THE ABOVE NUMBER.

## 2022-08-18 ENCOUNTER — APPOINTMENT (OUTPATIENT)
Dept: PHYSICAL THERAPY | Age: 52
End: 2022-08-18
Payer: COMMERCIAL

## 2022-08-23 ENCOUNTER — OFFICE VISIT (OUTPATIENT)
Dept: ORTHOPEDIC SURGERY | Age: 52
End: 2022-08-23

## 2022-08-23 ENCOUNTER — APPOINTMENT (OUTPATIENT)
Dept: PHYSICAL THERAPY | Age: 52
End: 2022-08-23
Payer: COMMERCIAL

## 2022-08-23 VITALS — WEIGHT: 114 LBS | HEIGHT: 68 IN | BODY MASS INDEX: 17.28 KG/M2

## 2022-08-23 DIAGNOSIS — Z98.890 S/P ARTHROSCOPY OF RIGHT SHOULDER: Primary | ICD-10-CM

## 2022-08-23 PROCEDURE — 99024 POSTOP FOLLOW-UP VISIT: CPT | Performed by: ORTHOPAEDIC SURGERY

## 2022-08-23 NOTE — LETTER
Shoulder Elbow Rehabilitation Referral    Patient Name: Rosa Isela Garcia      YOB: 1970    Diagnosis:   1. S/P arthroscopy of right shoulder        Precautions: RTC/Biceps    Post Op Instructions:  [] Continuous passive motion (CPM)  [x]Active Elbow range of motion  [] Exercise in plane of scapula   []  Strengthening     [] Pulley and instruction    [x] Home exercise program (copy to patient)   [x] Sling when arm at risk  [] Sling or brace at all times   [x] AAROM: Forward elevation to 90            [x] AAROM: External rotation to 20    [] Isometric external rotator strengthening [] AAROM: internal rotation: up the back  [x] Isometric abductor strengthening  [] AAROM: Internal abduction     [] Isometric internal rotator strengthening [] AAROM: cross-body adduction             Stretching:     Strengthening:  [] Four quadrant (FE, ER, IR, CBA)  [] Rotator cuff (ER, IR, Abd)  [] Forward Elevation    [] External Rotators     [] External Rotation    [] Internal Rotators  [] Internal Rotation: up/back   [] Abductors     [] Internal Rotation: supine in abduction  [] Flexors  [] Cross-body abduction    [] Extensors  [x] Pendulum (FE, Abd/Add, cw/ccw)  [x] Scapular Stabilizers   [] Wall-walking (FE, Abd)    [x] Shoulder shrugs     [x] Table slides      [x] Rhomboid pinch  [] Elbow (flex, ext, pron, sup)    [] Lat.  Pull downs     [] Medial epicondylitis program    [] Forward punch   [] Lateral epicondylitis program    [] Internal rotators     [] Progressive resistive exercises  [] Bench Press        [] Bench press plus  Activities:     [] Lateral pull-downs  [] Rowing     [] Progressive two-hand supine press  [] Stepper/Exercise bike   [] Biceps: curls/supination  [] Swimming  [] Water exercises    Modalities: PRN    Return to Sport:  [] Ultrasound     [] Plyometrics  [] Iontophoresis     [] Rhythmic stabilization  [] Moist heat     [] Core strengthening   [] Massage     [] Sports specific program:   [x] Cryotherapy      [] Electrical stimulation     [] Paraffin  [] Whirlpool  [] TENS    [x] Home exercise program (copy to patient). Perform exercises for:   15     minutes    2-3      times/day  [x] Supervised physical therapy  Frequency: []  1x week  [x] 2x week  [] 3x week  [] Other:   Duration: [] 2 weeks   [] 4 weeks  [x] 6 weeks  [] Other:     Additional Instructions:   May progress Forward Elevation by 10 degrees weekly up to 140 and External Rotation 5 degrees weekly up to 40         João Landon MD, PhD

## 2022-08-23 NOTE — PROGRESS NOTES
History of Present Illness:  Poonam Brandt is a pleasant 49 y.o. female who presents for a post operative visit. She is 3 weeks out following a  Right shoulder arthroscopic extensive debridement, subacromial decompression with acromioplasty; arthroscopic rotator cuff repair; open subpectoral biceps tenodesis on 8/1/22. Overall She is doing okay and feels that their pain is well controlled with current pain medications. She has been compliant with wearing the UltraSling brace at all times. She has continued in physical therapy at [facility]. She is having difficulty finding a comfortable position to sleep at night. She denies fevers, chills, numbness, tingling, and shortness of breath. She has been using the CPM chair as directed. Medical History:  Patient's medications, allergies, past medical, surgical, social and family histories were reviewed and updated as appropriate. No notes on file    Review of Systems  A 14 point review of systems was completed by the patient and is available in the media section of the scanned medical record and was reviewed on 8/23/2022. Vital Signs: There were no vitals filed for this visit. General/Appearance: Alert and oriented and in no apparent distress. Skin:  There are no skin lesions, cellulitis, or extreme edema. The patient has warm and well-perfused Bilateral upper extremities with brisk capillary refill. Right Shoulder Exam:    Inspection: Shoulder incision portals are clean, dry and intact and well approximated. Mild ecchymosis and swelling are present as can be expected. There is no erythema, drainage or other signs of infection    Palpation:  No crepitus to gentle motion    Active Assisted Range of Motion: Deferred    Passive Range of Motion:  Tolerates gentle passive motion well    Strength:  Deferred    Special Tests:  Deferred.     Neurovascular: Sensation to light touch is intact, no motor deficits, palpable radial pulses 2+    Radiology: No new XR obtained at this time. Assessment :  Ms. Christine Bowles is a pleasant 49 y.o. patient who is now 3 weeks out following a  Right shoulder arthroscopic extensive debridement, subacromial decompression with acromioplasty; arthroscopic rotator cuff repair; open subpectoral biceps tenodesis on 8/1/22. Impression:  Encounter Diagnosis   Name Primary? S/P arthroscopy of right shoulder Yes       Office Procedures:  Orders Placed This Encounter   Procedures    Amb External Referral To Physical Therapy     Referral Priority:   Routine     Referral Type:   Consult for Advice and Opinion     Referral Reason:   Patient Preference     Requested Specialty:   Orthopedic Physical Therapist     Number of Visits Requested:   1         Treatment Plan:    Overall Christine Bowles is doing well. She may begin to wean out of the Ultrasling brace when at home, but should continue to wear it when at risk. She may begin to drive but must remove the sling when doing so. Additionally, She must be off of pain medications during the day when driving. We recommend She continue in physical therapy. We will progress her therapy at this time. All of her questions were fully answered today. We would like to see Christine Bowles back in 3 weeks for follow-up visit. 8/23/2022  3:56 PM    Jas Alicia ATC  [facility] [address] [facility]    During this examination, I, Sergegemini Dashouse, functioned as a scribe for Dr. Safia Adamson. The history taking and physical examination were performed by Dr. Roshan Domingo. All counseling during the appointment was performed between the patient and Dr. Roshan Domingo. 8/23/2022  _________________  I, Dr. Safia Adamson, personally performed the services described in this documentation as described by Jas Alicia ATC in my presence, and it is both accurate and complete. João Domingo MD, PhD  8/23/2022

## 2022-08-25 ENCOUNTER — APPOINTMENT (OUTPATIENT)
Dept: PHYSICAL THERAPY | Age: 52
End: 2022-08-25
Payer: COMMERCIAL

## 2022-08-29 ENCOUNTER — APPOINTMENT (OUTPATIENT)
Dept: PHYSICAL THERAPY | Age: 52
End: 2022-08-29
Payer: COMMERCIAL

## 2022-08-31 ENCOUNTER — APPOINTMENT (OUTPATIENT)
Dept: PHYSICAL THERAPY | Age: 52
End: 2022-08-31
Payer: COMMERCIAL

## 2022-09-08 ENCOUNTER — TELEPHONE (OUTPATIENT)
Dept: ORTHOPEDIC SURGERY | Age: 52
End: 2022-09-08

## 2022-09-08 NOTE — TELEPHONE ENCOUNTER
Question     Subject: PRIOR AUTHORIZATION FOR RT [facility] SX  Patient  Request: Devante Zander    Contact Number: [phone]    Patient is req a return call regarding prior authorization for Rt shldr surgery. Patient states she received letter sx was denied. Please return call to above number.

## 2022-09-13 ENCOUNTER — OFFICE VISIT (OUTPATIENT)
Dept: ORTHOPEDIC SURGERY | Age: 52
End: 2022-09-13

## 2022-09-13 VITALS — BODY MASS INDEX: 17.28 KG/M2 | WEIGHT: 114 LBS | HEIGHT: 68 IN

## 2022-09-13 DIAGNOSIS — Z98.890 S/P ROTATOR CUFF REPAIR: ICD-10-CM

## 2022-09-13 DIAGNOSIS — Z98.890 S/P ARTHROSCOPY OF RIGHT SHOULDER: Primary | ICD-10-CM

## 2022-09-13 PROCEDURE — 99024 POSTOP FOLLOW-UP VISIT: CPT | Performed by: ORTHOPAEDIC SURGERY

## 2022-09-13 NOTE — LETTER
Physical Therapy Rehabilitation Referral    Patient Name:  Steven Opitz      YOB: 1970    Diagnosis:    1. S/P arthroscopy of right shoulder    2. S/P rotator cuff repair        Precautions:     [x] Evaluate and Treat    Post Op Instructions:  [] Continuous passive motion (CPM) [x] Elbow ROM  [x] Exercise in plane of scapula  [x]  Strengthening     [x] Pulley and instruction   [x] Home exercise program (copy to patient)   [] Sling when arm at risk  [] Sling or brace at all times   [x] AAROM: Forward elevation to  140            [x] AAROM: External rotation  To  40    [] Isometric external rotator strengthening [x] AAROM: internal rotation: up the back  [x] Isometric abductor strengthening  [x] AAROM: Internal abduction   [] Isometric internal rotator strengthening [x] AAROM: cross-body adduction             Stretching:     Strengthening:  [x] Four quadrant (FE, ER, IR, CBA)  [] Rotator cuff (ER, IR, Abd)  [x] Forward Elevation    [] External Rotators     [x] External Rotation    [] Internal Rotators  [x] Internal Rotation: up/back   [] Abductors     [] Internal Rotation: supine in abduction  [] Sleeper Stretch    [] Flexors  [x] Cross-body abduction    [] Extensors  [x] Pendulum (FE, Abd/Add, cw/ccw)  [x] Scapular Stabilizers   [x] Wall-walking (FE, Abd)        [x] Shoulder shrugs     [x] Table slides (FE)                [x] Rhomboid pinch  [] Elbow (flex, ext, pron, sup)        [] Lat.  Pull downs     [] Medial epicondylitis program       [] Forward punch   [] Lateral epicondylitis program       [] Internal rotators     [] Progressive resistive exercises  [] Bench Press        [] Bench press plus  Activities:     [] Lateral pull-downs  [] Rowing     [x] Progressive two-hand supine press  [] Stepper/Exercise bike   [x] Biceps: curls/supination  [] Swimming  [] Water exercises    Modalities:     Return to Sport:  [x] Of Choice      [] Plyometrics  [] Ultrasound     [] Rhythmic stabilization  [] Iontophoresis    [] Core strengthening   [] Moist heat     [] Sports specific program:   [] Massage         [x] Cryotherapy      [] Electrical stimulation     [] Paraffin  [] Whirlpool  [] TENS    [x] Home exercise program (copy to patient). Perform exercises for:   15     minutes    3      times/day  [x] Supervised physical therapy  Frequency: []  1x week  [x] 2x week  [] 3x week  [] Other:   Duration: [] 2 weeks   [] 4 weeks  [x] 6 weeks  [] Other:     Additional Instructions:     João Bailey MD, PhD

## 2022-09-13 NOTE — PROGRESS NOTES
History of Present Illness:  Jovi Hurley is a 49 y.o. female who presents for a post operative visit. The patient underwent a right shoulder rotator cuff repair and open biceps tenodesis on 8/1/2022 by Dr. Ulysses Raymond. She is currently 6 weeks postop. She reports she is going to physical therapy at [facility] 3 times a week. She has 60 sessions of physical therapy under her insurance plan. She has been compliant with her UltraSling brace. She denies any new injuries or setbacks since his last visit with us. The patient denies any fevers, chills, numbness, tingling, and shortness of breath. Medical History:  Patient's medications, allergies, past medical, surgical, social and family histories were reviewed and updated as appropriate. No notes on file    Review of Systems  A 14 point review of systems was completed by the patient is available in the media section of the scanned medical record and was reviewed on 9/13/2022. Vital Signs:  Vitals:       General/Appearance: Alert and oriented and in no apparent distress. Skin:  There are no skin lesions, cellulitis, or extreme edema. The patient has warm and well-perfused Bilateral upper extremities with brisk capillary refill.      right Shoulder Exam:    Inspection: right shoulder incision that is clean, dry and intact and well approximated. There is no erythema, drainage or other signs of infection    Palpation:  No crepitus to gentle motion    Active Range of Motion: Forward elevation of 90 degrees, external rotation of 20 and internal rotation to L4  Passive Range of Motion: 120 of forward elevation    Strength:  Deferred    Special Tests:  Deferred.     Neurovascular: Sensation to light touch is intact, no motor deficits, palpable radial pulses 2+    Radiology:     Plain radiographs not obtained         Assessment :  Ms. Jovi Hurley is a 49 y.o. patient underwent a right shoulder arthroscopy for rotator cuff repair, open biceps tenodesis on 8/1/2022      Impression:  Encounter Diagnoses   Name Primary? S/P arthroscopy of right shoulder Yes    S/P rotator cuff repair        Office Procedures:  Orders Placed This Encounter   Procedures    Amb External Referral To Physical Therapy     Referral Priority:   Routine     Referral Type:   Consult for Advice and Opinion     Referral Reason:   Patient Preference     Requested Specialty:   Orthopedic Physical Therapist     Number of Visits Requested:   1       Treatment Plan:    Overall, Jovan Newton  is doing well. The pain is well-controlled. We recommend that she discontinue the sling completely. We will have her continue going to physical therapy. Therapy orders were updated. We did print out with the physical therapy orders which were given to the patient today. She may continue to progress in her CPM machine. All of her questions were fully answered today. We would like to see her back in 4 weeks for follow-up visit. 1:00 PM      VIV Saavedra  Orthopaedic Sports Medicine Physician Assistant    During this examination, Quentin JOHNSON PA-C, functioned as a scribe for Dr. Nikky Corley. This dictation was performed with a verbal recognition program (DRAGON) and it was checked for errors. It is possible that there are still dictated errors within this office note. If so, please bring any errors to my attention for an addendum. All efforts were made to ensure that this office note is accurate.  _________________  I, Dr. Nikky Corley, personally performed the services described in this documentation as described by VIV Saavedra in my presence, and it is both accurate and complete. João Wray MD, PhD  9/13/2022

## 2022-09-20 ENCOUNTER — HOSPITAL ENCOUNTER (OUTPATIENT)
Dept: MAMMOGRAPHY | Age: 52
Discharge: HOME | End: 2022-09-20
Payer: COMMERCIAL

## 2022-09-20 VITALS — HEIGHT: 67 IN | BODY MASS INDEX: 18.21 KG/M2 | WEIGHT: 116 LBS

## 2022-09-20 DIAGNOSIS — Z12.31 VISIT FOR SCREENING MAMMOGRAM: ICD-10-CM

## 2022-09-20 PROCEDURE — 77063 BREAST TOMOSYNTHESIS BI: CPT

## 2022-09-22 ENCOUNTER — NURSE ONLY (OUTPATIENT)
Dept: FAMILY MEDICINE CLINIC | Age: 52
End: 2022-09-22
Payer: COMMERCIAL

## 2022-09-22 DIAGNOSIS — Z00.00 WELL ADULT EXAM: Primary | ICD-10-CM

## 2022-09-22 DIAGNOSIS — R53.83 FATIGUE, UNSPECIFIED TYPE: ICD-10-CM

## 2022-09-22 LAB
A/G RATIO: 1.9 (ref 1.1–2.2)
ALBUMIN SERPL-MCNC: 4.1 G/DL (ref 3.4–5)
ALP BLD-CCNC: 77 U/L (ref 40–129)
ALT SERPL-CCNC: 31 U/L (ref 10–40)
ANION GAP SERPL CALCULATED.3IONS-SCNC: 10 MMOL/L (ref 3–16)
AST SERPL-CCNC: 37 U/L (ref 15–37)
BILIRUB SERPL-MCNC: 0.3 MG/DL (ref 0–1)
BUN BLDV-MCNC: 24 MG/DL (ref 7–20)
CALCIUM SERPL-MCNC: 9.4 MG/DL (ref 8.3–10.6)
CHLORIDE BLD-SCNC: 109 MMOL/L (ref 99–110)
CHOLESTEROL, TOTAL: 139 MG/DL (ref 0–199)
CO2: 26 MMOL/L (ref 21–32)
CREAT SERPL-MCNC: 0.7 MG/DL (ref 0.6–1.1)
GFR AFRICAN AMERICAN: >60
GFR NON-AFRICAN AMERICAN: >60
GLUCOSE BLD-MCNC: 94 MG/DL (ref 70–99)
HCT VFR BLD CALC: 40.5 % (ref 36–48)
HDLC SERPL-MCNC: 55 MG/DL (ref 40–60)
HEMOGLOBIN: 13.5 G/DL (ref 12–16)
LDL CHOLESTEROL CALCULATED: 78 MG/DL
MCH RBC QN AUTO: 32.1 PG (ref 26–34)
MCHC RBC AUTO-ENTMCNC: 33.4 G/DL (ref 31–36)
MCV RBC AUTO: 96.2 FL (ref 80–100)
PDW BLD-RTO: 13.7 % (ref 12.4–15.4)
PLATELET # BLD: 139 K/UL (ref 135–450)
PMV BLD AUTO: 9.2 FL (ref 5–10.5)
POTASSIUM SERPL-SCNC: 4.7 MMOL/L (ref 3.5–5.1)
RBC # BLD: 4.21 M/UL (ref 4–5.2)
SODIUM BLD-SCNC: 145 MMOL/L (ref 136–145)
TOTAL PROTEIN: 6.3 G/DL (ref 6.4–8.2)
TRIGL SERPL-MCNC: 32 MG/DL (ref 0–150)
TSH SERPL DL<=0.05 MIU/L-ACNC: 0.81 UIU/ML (ref 0.27–4.2)
VITAMIN D 25-HYDROXY: 43.5 NG/ML
VLDLC SERPL CALC-MCNC: 6 MG/DL
WBC # BLD: 3.2 K/UL (ref 4–11)

## 2022-09-22 PROCEDURE — 36415 COLL VENOUS BLD VENIPUNCTURE: CPT | Performed by: FAMILY MEDICINE

## 2022-09-22 NOTE — PROGRESS NOTES
Patient here for fasting blood work.  Blood work drawn from right [facility] without complications  2sst  1lav

## 2022-09-30 ENCOUNTER — TELEPHONE (OUTPATIENT)
Dept: ORTHOPEDIC SURGERY | Age: 52
End: 2022-09-30

## 2022-09-30 ENCOUNTER — OFFICE VISIT (OUTPATIENT)
Dept: FAMILY MEDICINE CLINIC | Age: 52
End: 2022-09-30
Payer: COMMERCIAL

## 2022-09-30 VITALS
DIASTOLIC BLOOD PRESSURE: 68 MMHG | SYSTOLIC BLOOD PRESSURE: 96 MMHG | HEIGHT: 67 IN | BODY MASS INDEX: 18.68 KG/M2 | WEIGHT: 119 LBS

## 2022-09-30 DIAGNOSIS — Z00.00 WELL ADULT EXAM: ICD-10-CM

## 2022-09-30 DIAGNOSIS — Z12.11 COLON CANCER SCREENING: Primary | ICD-10-CM

## 2022-09-30 PROCEDURE — 99396 PREV VISIT EST AGE 40-64: CPT | Performed by: FAMILY MEDICINE

## 2022-09-30 ASSESSMENT — ENCOUNTER SYMPTOMS
GASTROINTESTINAL NEGATIVE: 1
RESPIRATORY NEGATIVE: 1

## 2022-09-30 NOTE — TELEPHONE ENCOUNTER
General Question     Subject: UPDATING FMLA  Patient and /or Facility Request: Mathieu Pressley  Contact Number: [phone]     Pt's HR IS TERMINATING THE Pt's, FMLA AS OF 09/27/22, UNLESS DR GIMENEZ WILL EXTEND HER COMPLETION DATE. SHE IS STILL IN PT 3 DAYS A WEEK AND WILL NOT SEE DR Snow All UNTIL 10/11/22. WILL DR Rachel Clos THE Pt's FMLA DATE UNTIL, AT [address] [facility], 10/11/22? PLEASE CALL AND LET THE Pt KNOW AT THE # ABOVE.

## 2022-09-30 NOTE — PROGRESS NOTES
OUTPATIENT PROGRESS NOTE  Date of Service:  9/30/2022  [facility]  [address] [place] [address],First Floor [id number]  Dept: [phone]  Loc: [phone]    Subjective:      Patient ID:  [id number]  Yessenia Peraza is a 49 y.o. female     HPI  Well exam  She is still struggling with family issues with son  Stressed but seeing grief counselor and working on herself  No new physical concerns  Still healing from shoulder surgery         Review of Systems   Constitutional: Negative. Respiratory: Negative. Cardiovascular: Negative. Gastrointestinal: Negative. Skin: Negative. Neurological: Negative. Objective:   YOB: 1970    Date of Visit:  9/30/2022       Allergies   Allergen Reactions    Amoxicillin Swelling and Angioedema     Throat swelling    Penicillins Swelling and Angioedema     Throat swelling       Outpatient Medications Marked as Taking for the 9/30/22 encounter (Office Visit) with Trina Snow, DO   Medication Sig Dispense Refill    diclofenac sodium (VOLTAREN) 1 % GEL Apply 2 to 4 g 3 times daily for 2 weeks then twice daily as needed thereafter 150 g 3    Cholecalciferol (VITAMIN D3) 50 MCG (2000 UT) CAPS Take 2 capsules by mouth in the morning. Magnesium Hydroxide (MAGNESIA PO) Take 400 mg by mouth daily      IRON, FERROUS GLUCONATE, PO Take 65 mg by mouth daily         Vitals:    09/30/22 0908   BP: 96/68   Weight: 119 lb (54 kg)   Height: 5' 7\" (1.702 m)     Body mass index is 18.64 kg/m².      Wt Readings from Last 3 Encounters:   09/30/22 119 lb (54 kg)   09/20/22 116 lb (52.6 kg)   09/13/22 114 lb (51.7 kg)     BP Readings from Last 3 Encounters:   09/30/22 96/68   08/01/22 104/66   07/15/22 84/60     Allergies   Allergen Reactions    Amoxicillin Swelling and Angioedema     Throat swelling    Penicillins Swelling and Angioedema     Throat swelling     Current Outpatient Medications Medication Sig Dispense Refill    diclofenac sodium (VOLTAREN) 1 % GEL Apply 2 to 4 g 3 times daily for 2 weeks then twice daily as needed thereafter 150 g 3    Cholecalciferol (VITAMIN D3) 50 MCG (2000 UT) CAPS Take 2 capsules by mouth in the morning. Magnesium Hydroxide (MAGNESIA PO) Take 400 mg by mouth daily      IRON, FERROUS GLUCONATE, PO Take 65 mg by mouth daily       Current Facility-Administered Medications   Medication Dose Route Frequency Provider Last Rate Last Admin    methylPREDNISolone acetate (DEPO-MEDROL) injection 80 mg  80 mg Intra-artICUlar Once Mira Gamble DO         Past Medical History:   Diagnosis Date    Allergic     Neuroma     Staph skin infection Oct 2012    knee     Past Surgical History:   Procedure Laterality Date    BONE MARROW BIOPSY  2018    FINGER SURGERY  01/01/2012    remove infection    FOOT SURGERY Right     seasmoidectomy    SHOULDER ARTHROSCOPY Right 8/1/2022    RIGHT SHOULDER EXAM UNDER ANESTHESIA, RIGH SHOULDER ARTHROSCOPIC DECOMPRESSION, ARTHROSCOPIC DEBRIDEMENT, ROTATOR CUFF REPAIR, OPEN BICEPS TENODESIS [facility]     Tobacco Use    Smoking status: Never    Smokeless tobacco: Never    Tobacco comments:     encouraged to never smoke    Vaping Use    Vaping Use: Never used   Substance Use Topics    Alcohol use: Yes     Comment: once a month    Drug use: No     Family History   Problem Relation Age of Onset    Parkinsonism Mother     High Cholesterol Mother     Glaucoma Father     High Cholesterol Father     Glaucoma Brother         four brothers with glaucoma     Breast Cancer Maternal Grandmother        Physical Exam  Vitals and nursing note reviewed. Constitutional:       General: She is not in acute distress. Appearance: She is well-developed. HENT:      Head: Normocephalic.       Right Ear: External ear normal.      Left Ear: External ear normal.      Nose: Nose normal.   Eyes:      General: Left eye: No discharge. Conjunctiva/sclera: Conjunctivae normal.      Pupils: Pupils are equal, round, and reactive to light. Neck:      Thyroid: No thyromegaly. Cardiovascular:      Rate and Rhythm: Normal rate and regular rhythm. Heart sounds: Normal heart sounds. No murmur heard. Pulmonary:      Effort: Pulmonary effort is normal. No respiratory distress. Breath sounds: No wheezing or rales. Abdominal:      General: There is no distension. Palpations: Abdomen is soft. There is no mass. Tenderness: There is no guarding. Musculoskeletal:      Cervical back: Normal range of motion. Lymphadenopathy:      Cervical: No cervical adenopathy. Skin:     General: Skin is warm and dry. Findings: No erythema or rash. Neurological:      Mental Status: She is alert and oriented to person, place, and time. Deep Tendon Reflexes: Reflexes are normal and symmetric. Psychiatric:         Behavior: Behavior normal.         Thought Content: Thought content normal.         Judgment: Judgment normal.          Assessment/Plan            Assessment/plan;  Anu Hawley was seen today for annual exam.    Diagnoses and all orders for this visit:    Colon cancer screening  -     [facility] - Rossy Santillan MD, Gastroenterology, [facility]    Well adult exam    Reviewed lab   all good  Continue with therapy  Recheck in one year  No follow-ups on file.              Sharmin Leong, DO

## 2022-10-03 ENCOUNTER — TELEPHONE (OUTPATIENT)
Dept: ORTHOPEDIC SURGERY | Age: 52
End: 2022-10-03

## 2022-10-11 ENCOUNTER — OFFICE VISIT (OUTPATIENT)
Dept: ORTHOPEDIC SURGERY | Age: 52
End: 2022-10-11

## 2022-10-11 ENCOUNTER — TELEPHONE (OUTPATIENT)
Dept: ORTHOPEDIC SURGERY | Age: 52
End: 2022-10-11

## 2022-10-11 VITALS — BODY MASS INDEX: 18.68 KG/M2 | HEIGHT: 67 IN | WEIGHT: 119 LBS

## 2022-10-11 DIAGNOSIS — Z98.890 S/P ROTATOR CUFF REPAIR: Primary | ICD-10-CM

## 2022-10-11 DIAGNOSIS — Z98.890 S/P ARTHROSCOPY OF RIGHT SHOULDER: ICD-10-CM

## 2022-10-11 PROCEDURE — 99024 POSTOP FOLLOW-UP VISIT: CPT | Performed by: ORTHOPAEDIC SURGERY

## 2022-10-11 NOTE — PROGRESS NOTES
History of Present Illness:  Flaco Padron is a 49 y.o. female who presents for a post operative visit. The patient underwent a right shoulder rotator cuff repair and open biceps tenodesis on 8/1/2022 by Dr. Fam Villagran. She is currently 10 weeks postop. She reports she is going to physical therapy at [facility] 3 times a week. She notes improvements in her ROM and her strength and has recently begun to implement band work in PT. She denies any new injuries or setbacks since his last visit with us. The patient denies any fevers, chills, numbness, tingling, and shortness of breath. Medical History:  Patient's medications, allergies, past medical, surgical, social and family histories were reviewed and updated as appropriate. No notes on file    Review of Systems  A 14 point review of systems was completed by the patient is available in the media section of the scanned medical record and was reviewed on 10/11/2022. Vital Signs: There were no vitals filed for this visit. General/Appearance: Alert and oriented and in no apparent distress. Skin:  There are no skin lesions, cellulitis, or extreme edema. The patient has warm and well-perfused Bilateral upper extremities with brisk capillary refill. Right Shoulder Exam:    Inspection: right shoulder incision that is clean, dry and intact and well approximated. There is no erythema, drainage or other signs of infection. Palpation:  No crepitus to gentle motion    Active Range of Motion: Forward elevation of 140 degrees, external rotation of 45 and internal rotation to L2  Passive Range of Motion: 170 of forward elevation    Strength:  Deferred    Special Tests:  Deferred.     Neurovascular: Sensation to light touch is intact, no motor deficits, palpable radial pulses 2+    Radiology:     Plain radiographs not obtained         Assessment :  Ms. Falco Padron is a 49 y.o. patient underwent a right shoulder arthroscopy for rotator cuff repair, open biceps tenodesis on 8/1/2022      Impression:  Encounter Diagnoses   Name Primary? S/P rotator cuff repair Yes    S/P arthroscopy of right shoulder          Office Procedures:  Orders Placed This Encounter   Procedures    Amb External Referral To Physical Therapy     Referral Priority:   Routine     Referral Type:   Consult for Advice and Opinion     Referral Reason:   Patient Preference     Requested Specialty:   Orthopedic Physical Therapist     Number of Visits Requested:   1         Treatment Plan:    Overall, Juan Wright  is doing well. The pain is well-controlled. We will have her continue going to physical therapy. Physical therapy orders were updated. All of her questions were fully answered today. We would like to see her back in 4 weeks for follow-up visit. 12:29 PM    Cinthya Bell DO  Surgical Fellow  [facility]    The encounter with the patient was supervised by Dr. Fred Penaloza who personally examined the patient and reviewed the plan.   ____________________  I was physically present and personally supervised the Orthopaedic Sports Medicine Fellow in the evaluation and development of a treatment plan for this patient. I personally interviewed the patient and performed a physical examination. In addition, I discussed the patient's condition and treatment options with them. I have also reviewed and agree with the past medical, family and social history unless otherwise noted. All of the patient's questions were answered. João Penaloza MD, PhD  10/11/2022

## 2022-10-11 NOTE — TELEPHONE ENCOUNTER
PATIENT STATES DR. GIMENEZ WAS GIVING HER NEW INSTRUCTIONS FOR THERAPY. PLEASE FAX THE NEW THERAPY INSTRUCTIONS TO [facility] IN [place].

## 2022-10-11 NOTE — LETTER
Shoulder Elbow Rehabilitation Referral    Patient Name: Carroll Álvarez      YOB: 1970    Diagnosis:   1. S/P rotator cuff repair    2. S/P arthroscopy of right shoulder        Precautions: RTC/Biceps    Post Op Instructions:  [x] Continuous passive motion (CPM)  [x]Active Elbow range of motion  [x] Exercise in plane of scapula   []  Strengthening     [] Pulley and instruction    [x] Home exercise program (copy to patient)   [x] Sling when arm at risk  [] Sling or brace at all times   [x] AAROM: Forward elevation to 90            [x] AAROM: External rotation to 20    [] Isometric external rotator strengthening [] AAROM: internal rotation: up the back  [x] Isometric abductor strengthening  [] AAROM: Internal abduction     [] Isometric internal rotator strengthening [] AAROM: cross-body adduction             Stretching:     Strengthening:  [x] Four quadrant (FE, ER, IR, CBA)  [] Rotator cuff (ER, IR, Abd)  [x] Forward Elevation    [] External Rotators     [x] External Rotation    [] Internal Rotators  [x] Internal Rotation: up/back   [] Abductors     [x] Internal Rotation: supine in abduction  [] Flexors  [] Cross-body abduction    [] Extensors  [x] Pendulum (FE, Abd/Add, cw/ccw)  [x] Scapular Stabilizers   [] Wall-walking (FE, Abd)    [x] Shoulder shrugs     [x] Table slides      [x] Rhomboid pinch  [] Elbow (flex, ext, pron, sup)    [] Lat.  Pull downs     [] Medial epicondylitis program    [] Forward punch   [] Lateral epicondylitis program    [] Internal rotators     [] Progressive resistive exercises  [] Bench Press        [] Bench press plus  Activities:     [] Lateral pull-downs  [] Rowing     [] Progressive two-hand supine press  [] Stepper/Exercise bike   [x] Biceps: curls/supination & triceps  [] Swimming  [] Water exercises    Modalities: PRN    Return to Sport:  [] Ultrasound     [] Plyometrics  [] Iontophoresis     [] Rhythmic stabilization  [] Moist heat     [] Core strengthening   [] Massage     [] Sports specific program:   [x] Cryotherapy      [] Electrical stimulation     [] Paraffin  [] Whirlpool  [] TENS    [x] Home exercise program (copy to patient). Perform exercises for:   15     minutes    2-3      times/day  [x] Supervised physical therapy  Frequency: []  1x week  [x] 2x week  [] 3x week  [] Other:   Duration: [] 2 weeks   [] 4 weeks  [x] 6 weeks  [] Other:     Additional Instructions:   May progress to RC strengthening in 4 weeks. João Rojas MD, PhD

## 2022-11-01 ENCOUNTER — TELEPHONE (OUTPATIENT)
Dept: ORTHOPEDIC SURGERY | Age: 52
End: 2022-11-01

## 2022-11-23 ENCOUNTER — OFFICE VISIT (OUTPATIENT)
Dept: ORTHOPEDIC SURGERY | Age: 52
End: 2022-11-23
Payer: COMMERCIAL

## 2022-11-23 VITALS — HEIGHT: 67 IN | WEIGHT: 117 LBS | BODY MASS INDEX: 18.36 KG/M2

## 2022-11-23 DIAGNOSIS — Z98.890 STATUS POST ARTHROSCOPY OF RIGHT SHOULDER: Primary | ICD-10-CM

## 2022-11-23 PROCEDURE — 99213 OFFICE O/P EST LOW 20 MIN: CPT | Performed by: ORTHOPAEDIC SURGERY

## 2022-11-23 NOTE — PROGRESS NOTES
History of Present Illness:  Vonnie Ricks is a 47 y.o. female who presents for a post operative visit. The patient underwent a right shoulder rotator cuff repair and open biceps tenodesis on 8/1/2022 by Dr. Harman Carrel. She is currently 16 weeks postop. She reports she is going to physical therapy at [facility]. She notes improvements in her ROM and her strength. She denies any new injuries or setbacks since his last visit with us. She still experiences intermittent discomfort over the anterior shoulder, especially at night. But overall, she is happy that she had the surgery. Medical History:  Patient's medications, allergies, past medical, surgical, social and family histories were reviewed and updated as appropriate. No notes on file    Review of Systems  A 14 point review of systems was completed by the patient is available in the media section of the scanned medical record and was reviewed on 11/23/2022. Vital Signs: There were no vitals filed for this visit. General/Appearance: Alert and oriented and in no apparent distress. Skin:  There are no skin lesions, cellulitis, or extreme edema. The patient has warm and well-perfused Bilateral upper extremities with brisk capillary refill. Right Shoulder Exam:    Inspection: Right shoulder incision that is clean, dry and intact and well approximated. There is no erythema, drainage or other signs of infection. Palpation:  No crepitus to gentle motion; no palpable defects    Active Range of Motion:  Forward elevation of 170 degrees, external rotation of 50 and internal rotation to L1    Strength: External rotators 4/5, internal rotators 4/5, champagne toast testing 4+/5    Special Tests: No Shailesh deformity    Neurovascular: Sensation to light touch is intact, no motor deficits, palpable radial pulses 2+    Radiology:     Plain radiographs not obtained         Assessment :  Ms. Vonnie Ricks is a 47 y.o. patient underwent a right shoulder arthroscopy for rotator cuff repair, open biceps tenodesis on 8/1/2022      Impression:  Encounter Diagnosis   Name Primary? Status post arthroscopy of right shoulder Yes         Office Procedures:  Orders Placed This Encounter   Procedures    Amb External Referral To Physical Therapy     Referral Priority:   Routine     Referral Type:   Consult for Advice and Opinion     Referral Reason:   Patient Preference     Requested Specialty:   Orthopedic Physical Therapist     Number of Visits Requested:   1           Treatment Plan:    Overall, Penelope Gama  is doing well. The pain is well-controlled. We will have her continue going to physical therapy through the end of the year. Physical therapy orders were updated. We recommend Voltaren cream for periscapular achiness. All of her questions were fully answered today. We would like to see her back in 6 weeks for follow-up visit. 9:33 AM    Kezia Callahan D.O. Orthopedic Surgeon, [facility] Fellow    The encounter with the patient was supervised by Dr. Simeon Clay who personally examined the patient and reviewed the plan.     ___________________  I was physically present and personally supervised the Orthopaedic Sports Medicine Fellow in the evaluation and development of a treatment plan for this patient. I personally interviewed the patient and performed a physical examination. In addition, I discussed the patient's condition and treatment options with them. I have also reviewed and agree with the past medical, family and social history unless otherwise noted. All of the patient's questions were answered. João Clay MD, PhD  11/23/2022

## 2022-12-16 RX ORDER — ASCORBIC ACID 500 MG
500 TABLET ORAL DAILY
COMMUNITY

## 2022-12-16 NOTE — PROGRESS NOTES
[facility] ENDOSCOPY COLONOSCOPY PRE-OPERATIVE INSTRUCTIONS    Procedure date_12/27/22________Arrival time__1000__________        Surgery time__1100__________       Clear liquids the day before the procedure. Do not eat or drink anything within 5 hours of your procedure. This includes water chewing gum, mints and ice chips. You may brush your teeth and gargle the morning of your surgery, but do not swallow the water    You may be asked to stop blood thinners such as Coumadin, Plavix, Fragmin, Lovenox, etc., or any anti-inflammatories such as:  Aspirin, Ibuprofen, Advil, Naproxen prior to your procedure. We also ask that you stop any OTC medications such as fish oil, vitamin E, glucosamine, garlic, Multivitamins, COQ 10, etc.    You must make arrangements for a responsible adult to arrive with you and stay in our waiting area during your procedure. They will also need to take you home after your procedure. For your safety you will not be allowed to leave alone or drive yourself home. Also for your safety, it is strongly suggested that someone stay with you the first 24 hours after your procedure. For your comfort, please wear simple loose fitting clothing to the center. Please do not bring valuables. If you have a living will and a durable power of  for healthcare, please bring in a copy.      You will need to bring a photo ID and insurance card    Our goal is to provide you with excellent care so if you have any questions, please contact us at the [facility] at [phone]         Please note these are generalized instructions for all colonoscopy cases, you may be provided with more specific instructions if necessary

## 2022-12-23 ENCOUNTER — ANESTHESIA EVENT (OUTPATIENT)
Dept: ENDOSCOPY | Age: 52
End: 2022-12-23
Payer: COMMERCIAL

## 2022-12-27 ENCOUNTER — ANESTHESIA (OUTPATIENT)
Dept: ENDOSCOPY | Age: 52
End: 2022-12-27
Payer: COMMERCIAL

## 2022-12-27 ENCOUNTER — HOSPITAL ENCOUNTER (OUTPATIENT)
Age: 52
Setting detail: OUTPATIENT SURGERY
Discharge: HOME | End: 2022-12-27
Attending: INTERNAL MEDICINE | Admitting: INTERNAL MEDICINE
Payer: COMMERCIAL

## 2022-12-27 VITALS
OXYGEN SATURATION: 100 % | TEMPERATURE: 97.1 F | DIASTOLIC BLOOD PRESSURE: 60 MMHG | HEART RATE: 49 BPM | SYSTOLIC BLOOD PRESSURE: 102 MMHG | WEIGHT: 117 LBS | BODY MASS INDEX: 18.36 KG/M2 | HEIGHT: 67 IN | RESPIRATION RATE: 16 BRPM

## 2022-12-27 PROCEDURE — 2580000003 HC RX 258: Performed by: ANESTHESIOLOGY

## 2022-12-27 PROCEDURE — 7100000010 HC PHASE II RECOVERY - FIRST 15 MIN: Performed by: INTERNAL MEDICINE

## 2022-12-27 PROCEDURE — 3700000001 HC ADD 15 MINUTES (ANESTHESIA): Performed by: INTERNAL MEDICINE

## 2022-12-27 PROCEDURE — 7100000011 HC PHASE II RECOVERY - ADDTL 15 MIN: Performed by: INTERNAL MEDICINE

## 2022-12-27 PROCEDURE — 3609027000 HC COLONOSCOPY: Performed by: INTERNAL MEDICINE

## 2022-12-27 PROCEDURE — 6360000002 HC RX W HCPCS: Performed by: NURSE ANESTHETIST, CERTIFIED REGISTERED

## 2022-12-27 PROCEDURE — 3700000000 HC ANESTHESIA ATTENDED CARE: Performed by: INTERNAL MEDICINE

## 2022-12-27 RX ORDER — PROPOFOL 10 MG/ML
INJECTION, EMULSION INTRAVENOUS CONTINUOUS PRN
Status: DISCONTINUED | OUTPATIENT
Start: 2022-12-27 | End: 2022-12-27 | Stop reason: SDUPTHER

## 2022-12-27 RX ORDER — SODIUM CHLORIDE 9 MG/ML
INJECTION, SOLUTION INTRAVENOUS PRN
Status: DISCONTINUED | OUTPATIENT
Start: 2022-12-27 | End: 2022-12-27 | Stop reason: HOSPADM

## 2022-12-27 RX ORDER — SODIUM CHLORIDE 0.9 % (FLUSH) 0.9 %
5-40 SYRINGE (ML) INJECTION PRN
Status: DISCONTINUED | OUTPATIENT
Start: 2022-12-27 | End: 2022-12-27 | Stop reason: HOSPADM

## 2022-12-27 RX ORDER — SODIUM CHLORIDE 0.9 % (FLUSH) 0.9 %
5-40 SYRINGE (ML) INJECTION EVERY 12 HOURS SCHEDULED
Status: DISCONTINUED | OUTPATIENT
Start: 2022-12-27 | End: 2022-12-27 | Stop reason: HOSPADM

## 2022-12-27 RX ADMIN — SODIUM CHLORIDE: 9 INJECTION, SOLUTION INTRAVENOUS at 11:32

## 2022-12-27 RX ADMIN — PROPOFOL 140 MCG/KG/MIN: 10 INJECTION, EMULSION INTRAVENOUS at 11:36

## 2022-12-27 ASSESSMENT — LIFESTYLE VARIABLES: SMOKING_STATUS: 0

## 2022-12-27 ASSESSMENT — PAIN - FUNCTIONAL ASSESSMENT: PAIN_FUNCTIONAL_ASSESSMENT: 0-10

## 2022-12-27 NOTE — H&P
Gastroenterology Outpatient History and Physical     Patient: Argenis Hopkins MRN: [id number] Sex: female   YOB: 1970 Age: 47 y.o. Location: [address] [place] [address]    Date:12/27/2022  Primary Care Physician: Teodora Moritz, DO         Patient: Argenis Hopkins    Physician: Alaina Walls MD    History of Present Illness: colon cancer screening  Review of Systems:  Weight Loss: No  Dysphagia: No  Dyspepsia: No  History:  Past Medical History:   Diagnosis Date    Allergic     Neuroma     Staph skin infection Oct 2012    knee      Past Surgical History:   Procedure Laterality Date    BONE MARROW BIOPSY  2018    FINGER SURGERY  01/01/2012    remove infection    FOOT SURGERY Right     seasmoidectomy    SHOULDER ARTHROSCOPY Right 8/1/2022    RIGHT SHOULDER EXAM UNDER ANESTHESIA, RIGH SHOULDER ARTHROSCOPIC DECOMPRESSION, ARTHROSCOPIC DEBRIDEMENT, ROTATOR CUFF REPAIR, OPEN BICEPS TENODESIS performed by Suly Tello MD at [address] [place] [address] History     Socioeconomic History    Marital status:       Spouse name: None    Number of children: None    Years of education: None    Highest education level: None   Tobacco Use    Smoking status: Never    Smokeless tobacco: Never    Tobacco comments:     encouraged to never smoke    Vaping Use    Vaping Use: Never used   Substance and Sexual Activity    Alcohol use: Yes     Comment: once a month    Drug use: No     Social Determinants of Health     Physical Activity: Sufficiently Active    Days of Exercise per Week: 6 days    Minutes of Exercise per Session: 40 min   Intimate Partner Violence: Not At Risk    Fear of Current or Ex-Partner: No    Emotionally Abused: No    Physically Abused: No    Sexually Abused: No      Family History   Problem Relation Age of Onset    Parkinsonism Mother     High Cholesterol Mother     Glaucoma Father     High Cholesterol Father     Glaucoma Brother         four brothers with glaucoma     Breast Cancer Maternal Grandmother      Allergies: Allergies   Allergen Reactions    Amoxicillin Swelling and Angioedema     Throat swelling    Penicillins Swelling and Angioedema     Throat swelling     Medications:   Prior to Admission medications    Medication Sig Start Date End Date Taking? Authorizing Provider   vitamin C (ASCORBIC ACID) 500 MG tablet Take 500 mg by mouth daily   Yes Historical Provider, MD   diclofenac sodium (VOLTAREN) 1 % GEL Apply 2 to 4 g 3 times daily for 2 weeks then twice daily as needed thereafter 4/27/22   Arnoldo Pretty MD   Cholecalciferol (VITAMIN D3) 50 MCG (2000 UT) CAPS Take 2 capsules by mouth in the morning. Historical Provider, MD   Magnesium Hydroxide (MAGNESIA PO) Take 400 mg by mouth daily  Patient not taking: Reported on 12/27/2022    Historical Provider, MD   IRON, FERROUS GLUCONATE, PO Take 65 mg by mouth daily    Historical Provider, MD       Vital Signs: BP (!) 99/58   Pulse 53   Temp 98.4 °F (36.9 °C) (Temporal)   Resp 16   Ht 5' 7\" (1.702 m)   Wt 117 lb (53.1 kg)   LMP 09/11/2011 (Approximate)   SpO2 100%   BMI 18.32 kg/m²   Physical Exam:   Heart: regular   Lungs: non-labored breathing  Mental status:  Alert and oriented    ASA score:  ASA 2 - Patient with mild systemic disease with no functional limitations{  Mallimpati score:  2     Planned Procedure: colonoscopy    Planned Sedation: Monitored anesthesia.     Signed By: Alaina Walls MD   December 27, 2022

## 2022-12-27 NOTE — DISCHARGE INSTRUCTIONS

## 2022-12-27 NOTE — ANESTHESIA PRE PROCEDURE
Department of Anesthesiology  Preprocedure Note       Name:  Ailyn Oseguera   Age:  47 y.o.  :  1970                                          MRN:  [id number]         Date:  2022      Surgeon: Melissa Sarabia):  Traci Badillo MD    Procedure: Procedure(s):  COLORECTAL CANCER SCREENING, NOT HIGH RISK    Medications prior to admission:   Prior to Admission medications    Medication Sig Start Date End Date Taking? Authorizing Provider   vitamin C (ASCORBIC ACID) 500 MG tablet Take 500 mg by mouth daily    Historical Provider, MD   diclofenac sodium (VOLTAREN) 1 % GEL Apply 2 to 4 g 3 times daily for 2 weeks then twice daily as needed thereafter 22   Ryann Steele MD   Cholecalciferol (VITAMIN D3) 50 MCG ( UT) CAPS Take 2 capsules by mouth in the morning. Historical Provider, MD   Magnesium Hydroxide (MAGNESIA PO) Take 400 mg by mouth daily    Historical Provider, MD   IRON, FERROUS GLUCONATE, PO Take 65 mg by mouth daily    Historical Provider, MD       Current medications:    No current facility-administered medications for this visit. No current outpatient medications on file. Allergies:     Allergies   Allergen Reactions    Amoxicillin Swelling and Angioedema     Throat swelling    Penicillins Swelling and Angioedema     Throat swelling       Problem List:    Patient Active Problem List   Diagnosis Code    Felon of middle finger of right hand L03.011    Arthritis of left acromioclavicular joint M19.012    Tendinitis of left rotator cuff M75.82    Chronic left shoulder pain M25.512, G89.29    Bursitis of shoulder M75.50    Partial tear of left rotator cuff M75.112    Closed Chapman's fracture of left thumb S62.212A    Partial hamstring tear, subsequent encounter C70.946I    Left proximal hamstring tendon rupture, subsequent encounter S76.312D       Past Medical History:        Diagnosis Date    Allergic     Neuroma     Staph skin infection Oct 2012    knee Past Surgical History:        Procedure Laterality Date    BONE MARROW BIOPSY  2018    FINGER SURGERY  01/01/2012    remove infection    FOOT SURGERY Right     seasmoidectomy    SHOULDER ARTHROSCOPY Right 8/1/2022    RIGHT SHOULDER EXAM UNDER ANESTHESIA, RIGH SHOULDER ARTHROSCOPIC DECOMPRESSION, ARTHROSCOPIC DEBRIDEMENT, ROTATOR CUFF REPAIR, OPEN BICEPS TENODESIS performed by Dinesh Machado MD at [address] History:    Social History     Tobacco Use    Smoking status: Never    Smokeless tobacco: Never    Tobacco comments:     encouraged to never smoke    Substance Use Topics    Alcohol use: Yes     Comment: once a month                                Counseling given: Not Answered  Tobacco comments: encouraged to never smoke       Vital Signs (Current): There were no vitals filed for this visit.                                            BP Readings from Last 3 Encounters:   09/30/22 96/68   08/01/22 104/66   07/15/22 84/60       NPO Status:                                                                                 BMI:   Wt Readings from Last 3 Encounters:   12/16/22 117 lb (53.1 kg)   11/23/22 117 lb (53.1 kg)   10/11/22 119 lb (54 kg)     There is no height or weight on file to calculate BMI.    CBC:   Lab Results   Component Value Date/Time    WBC 3.2 09/22/2022 09:10 AM    RBC 4.21 09/22/2022 09:10 AM    RBC 4.28 10/02/2015 10:23 AM    HGB 13.5 09/22/2022 09:10 AM    HCT 40.5 09/22/2022 09:10 AM    MCV 96.2 09/22/2022 09:10 AM    RDW 13.7 09/22/2022 09:10 AM     09/22/2022 09:10 AM       CMP:   Lab Results   Component Value Date/Time     09/22/2022 09:10 AM    K 4.7 09/22/2022 09:10 AM     09/22/2022 09:10 AM    CO2 26 09/22/2022 09:10 AM    BUN 24 09/22/2022 09:10 AM    CREATININE 0.7 09/22/2022 09:10 AM    GFRAA >60 09/22/2022 09:10 AM    GFRAA >60 08/13/2012 09:50 AM    AGRATIO 1.9 09/22/2022 09:10 AM    LABGLOM >60 09/22/2022 09:10 AM    GLUCOSE 94 09/22/2022 09:10 AM    PROT 6.3 09/22/2022 09:10 AM    PROT 6.8 08/13/2012 09:50 AM    CALCIUM 9.4 09/22/2022 09:10 AM    BILITOT 0.3 09/22/2022 09:10 AM    ALKPHOS 77 09/22/2022 09:10 AM    AST 37 09/22/2022 09:10 AM    ALT 31 09/22/2022 09:10 AM       POC Tests: No results for input(s): POCGLU, POCNA, POCK, POCCL, POCBUN, POCHEMO, POCHCT in the last 72 hours. Coags: No results found for: PROTIME, INR, APTT    HCG (If Applicable):   Lab Results   Component Value Date    PREGTESTUR Negative 08/01/2022        ABGs: No results found for: PHART, PO2ART, UNR8CKT, HOT9NTY, BEART, N8XGQYPO     Type & Screen (If Applicable):  No results found for: LABABO, LABRH    Drug/Infectious Status (If Applicable):  No results found for: HIV, HEPCAB    COVID-19 Screening (If Applicable): No results found for: COVID19        Anesthesia Evaluation   no history of anesthetic complications:   Airway: Mallampati: I  TM distance: >3 FB   Neck ROM: full  Mouth opening: > = 3 FB   Dental: normal exam         Pulmonary:normal exam        (-) COPD, asthma and not a current smoker                           Cardiovascular:  Exercise tolerance: good (>4 METS),       (-) hypertension,  angina, orthopnea and PND      Rhythm: regular  Rate: normal                    Neuro/Psych:      (-) seizures, neuromuscular disease, TIA and CVA           GI/Hepatic/Renal:        (-) liver disease and no renal disease       Endo/Other:    (+) : arthritis:., .    (-) diabetes mellitus               Abdominal:         (-) obese Abdomen: soft. Vascular: Other Findings:             Anesthesia Plan      MAC     ASA 2     (52/F presenting for screening colonoscopy. NPO appropriate. Denies active nausea / reflux.)        Anesthetic plan and risks discussed with patient. Plan discussed with CRNA.             This pre-anesthesia assessment may be used as a history and physical.    DOS STAFF ADDENDUM:    Pt seen and examined, chart reviewed (including anesthesia, drug and allergy history). No interval changes to history and physical examination. Anesthetic plan, risks, benefits, alternatives, and personnel involved discussed with patient. Patient verbalized an understanding and agrees to proceed.       Hilaria Freeman MD  December 27, 2022  10:31 AM

## 2022-12-27 NOTE — ANESTHESIA POSTPROCEDURE EVALUATION
Department of Anesthesiology  Postprocedure Note    Patient: Danitza Romero  MRN: [id number]  YOB: 1970  Date of evaluation: 12/27/2022      Procedure Summary     Date: 12/27/22 Room / Location: [address] [place] [address]    Anesthesia Start: 8564 Anesthesia Stop: 0955    Procedure: COLORECTAL CANCER SCREENING, NOT HIGH RISK Diagnosis:       Screen for colon cancer      (Screen for colon cancer)    Surgeons: Lupe Santos MD Responsible Provider: Maame Dooley MD    Anesthesia Type: MAC ASA Status: 2          Anesthesia Type: MAC    Ward Phase I: Ward Score: 10    Ward Phase II: Ward Score: 10      Anesthesia Post Evaluation    Patient location during evaluation: PACU  Patient participation: complete - patient participated  Level of consciousness: awake  Airway patency: patent  Nausea & Vomiting: no nausea and no vomiting  Complications: no  Cardiovascular status: hemodynamically stable and blood pressure returned to baseline  Respiratory status: spontaneous ventilation, nonlabored ventilation and room air  Hydration status: stable  Comments: Ms. Tomeka Catherine was seen resting comfortably following procedure. Appropriate for discharge home with .

## 2022-12-27 NOTE — OP NOTE
COLONOSCOPY     Patient: Camille Bernard MRN: [id number]   YOB: 1970 Age: 47 y.o. Sex: female       Admitting Physician: Kirsten Urbano     Primary Care Physician: Thao Lyle DO      DATE OF PROCEDURE: 12/27/2022  PROCEDURE: Colonoscopy    PREOPERATIVE DIAGNOSIS: Screen for colon cancer  HPI: This is a 47y.o. year old female who presents today for colon cancer screening and screening colonoscopy. ENDOSCOPIST: Rachel Bautista MD    POSTOPERATIVE DIAGNOSIS:    1.  Negative colonoscopy    PLAN:   1.  Screening colonoscopy in 10 years    INFORMED CONSENT:  Informed consent for colonoscopy was obtained. The benefits and risks including adverse medicine reaction and perforation have been explained. The patient's questions were answered and the patient agreed to proceed. ASA: ASA 2 - Patient with mild systemic disease with no functional limitations     SEDATION: MAC    The patient's vital signs, cardiac status, pulmonary status, abdominal status and mental status were stable for the procedure. The patient's vital signs and respiratory function as monitored by oxygen saturation remained stable. COLON PREPARATION:  The patient was given a split colon preparation and the preparation was adequate. Procedure Details: An anal exam was performed and this was unremarkable. A digital rectal exam was performed and no masses palpated. The Olympus videocolonoscope  was inserted in the rectum and carefully advanced to the cecum as identified by IC valve, crow's foot appearance and appendix. The cecum was photodocumented. The colonoscope was slowly withdrawn and retrograde examination of the colon was carefully performed with inspection around and between folds. The ascending colon and cecum were intubated twice with repeat antegrade and retrograde examination. Retroflexion in the rectum was performed. Cecum Intubated: Yes    Findings: There are no polyps or tumors.     Estimated Blood Loss:  None  Complications: None    Signed By: Marbin Ruiz MD

## 2023-01-10 ENCOUNTER — OFFICE VISIT (OUTPATIENT)
Dept: ORTHOPEDIC SURGERY | Age: 53
End: 2023-01-10

## 2023-01-10 VITALS — HEIGHT: 67 IN | BODY MASS INDEX: 18.36 KG/M2 | WEIGHT: 117 LBS

## 2023-01-10 DIAGNOSIS — Z98.890 S/P ROTATOR CUFF REPAIR: Primary | ICD-10-CM
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
## 2023-07-02 SDOH — HEALTH STABILITY: PHYSICAL HEALTH: ON AVERAGE, HOW MANY DAYS PER WEEK DO YOU ENGAGE IN MODERATE TO STRENUOUS EXERCISE (LIKE A BRISK WALK)?: 7 DAYS

## 2023-07-02 SDOH — HEALTH STABILITY: PHYSICAL HEALTH: ON AVERAGE, HOW MANY MINUTES DO YOU ENGAGE IN EXERCISE AT THIS LEVEL?: 50 MIN

## 2023-07-05 ENCOUNTER — OFFICE VISIT (OUTPATIENT)
Dept: ORTHOPEDIC SURGERY | Age: 53
End: 2023-07-05
Payer: COMMERCIAL

## 2023-07-05 VITALS — WEIGHT: 123.5 LBS | HEIGHT: 67 IN | BODY MASS INDEX: 19.38 KG/M2

## 2023-07-05 DIAGNOSIS — S76.312S: ICD-10-CM

## 2023-07-05 DIAGNOSIS — S76.312S PARTIAL HAMSTRING TEAR, LEFT, SEQUELA: ICD-10-CM

## 2023-07-05 DIAGNOSIS — S76.312A HAMSTRING STRAIN, LEFT, INITIAL ENCOUNTER: ICD-10-CM

## 2023-07-05 PROCEDURE — 99214 OFFICE O/P EST MOD 30 MIN: CPT | Performed by: INTERNAL MEDICINE

## 2023-07-05 RX ORDER — VIT C/B6/B5/MAGNESIUM/HERB 173 50-5-6-5MG
1000 CAPSULE ORAL DAILY
COMMUNITY

## 2023-09-12 ENCOUNTER — TELEPHONE (OUTPATIENT)
Dept: FAMILY MEDICINE CLINIC | Age: 53
End: 2023-09-12

## 2023-09-12 DIAGNOSIS — Z00.00 WELL ADULT EXAM: Primary | ICD-10-CM

## 2023-09-12 NOTE — TELEPHONE ENCOUNTER
----- Message from Natalie Pino sent at 9/12/2023  2:07 PM EDT -----  Subject: Referral Request    Reason for referral request? Pt would like to have the following bloodwork   orders written in advance of her physical on 10/11. TSH, Lipid Panel,   Comprehensive Metab, CBC, Vitamin D 25 Hydroxy ( fasting)  Provider patient wants to be referred to(if known):     Provider Phone Number(if known): Additional Information for Provider? Pt requests an AM appt due to her   work schedule.  Please contact to schedule lab visit with pt.  ---------------------------------------------------------------------------  --------------  [address]    [phone]; OK to leave message on voicemail,OK to respond with electronic   message via [employer] portal (only for patients who have registered [employer]   account)  ---------------------------------------------------------------------------  --------------

## 2023-09-21 ENCOUNTER — HOSPITAL ENCOUNTER (OUTPATIENT)
Dept: MAMMOGRAPHY | Age: 53
Discharge: HOME | End: 2023-09-21
Payer: COMMERCIAL

## 2023-09-21 VITALS — WEIGHT: 118 LBS | HEIGHT: 67 IN | BODY MASS INDEX: 18.52 KG/M2

## 2023-09-21 DIAGNOSIS — Z12.31 VISIT FOR SCREENING MAMMOGRAM: ICD-10-CM

## 2023-09-21 PROCEDURE — 77063 BREAST TOMOSYNTHESIS BI: CPT

## 2023-10-02 ENCOUNTER — NURSE ONLY (OUTPATIENT)
Dept: FAMILY MEDICINE CLINIC | Age: 53
End: 2023-10-02
Payer: COMMERCIAL

## 2023-10-02 DIAGNOSIS — Z00.00 WELL ADULT EXAM: ICD-10-CM

## 2023-10-02 LAB
25(OH)D3 SERPL-MCNC: 41.4 NG/ML
ALBUMIN SERPL-MCNC: 4.6 G/DL (ref 3.4–5)
ALBUMIN/GLOB SERPL: 2.9 {RATIO} (ref 1.1–2.2)
ALP SERPL-CCNC: 76 U/L (ref 40–129)
ALT SERPL-CCNC: 30 U/L (ref 10–40)
ANION GAP SERPL CALCULATED.3IONS-SCNC: 7 MMOL/L (ref 3–16)
AST SERPL-CCNC: 43 U/L (ref 15–37)
BASOPHILS # BLD: 0.1 K/UL (ref 0–0.2)
BASOPHILS NFR BLD: 2.7 %
BILIRUB SERPL-MCNC: 0.3 MG/DL (ref 0–1)
BUN SERPL-MCNC: 25 MG/DL (ref 7–20)
CALCIUM SERPL-MCNC: 8.9 MG/DL (ref 8.3–10.6)
CHLORIDE SERPL-SCNC: 108 MMOL/L (ref 99–110)
CHOLEST SERPL-MCNC: 139 MG/DL (ref 0–199)
CO2 SERPL-SCNC: 27 MMOL/L (ref 21–32)
CREAT SERPL-MCNC: 0.7 MG/DL (ref 0.6–1.1)
DEPRECATED RDW RBC AUTO: 13.5 % (ref 12.4–15.4)
EOSINOPHIL # BLD: 0.1 K/UL (ref 0–0.6)
EOSINOPHIL NFR BLD: 2.8 %
GFR SERPLBLD CREATININE-BSD FMLA CKD-EPI: >60 ML/MIN/{1.73_M2}
GLUCOSE P FAST SERPL-MCNC: 93 MG/DL (ref 70–99)
HCT VFR BLD AUTO: 40.2 % (ref 36–48)
HDLC SERPL-MCNC: 57 MG/DL (ref 40–60)
HGB BLD-MCNC: 13.5 G/DL (ref 12–16)
LDL CHOLESTEROL CALCULATED: 76 MG/DL
LYMPHOCYTES # BLD: 0.8 K/UL (ref 1–5.1)
LYMPHOCYTES NFR BLD: 31.2 %
MCH RBC QN AUTO: 31.9 PG (ref 26–34)
MCHC RBC AUTO-ENTMCNC: 33.6 G/DL (ref 31–36)
MCV RBC AUTO: 95 FL (ref 80–100)
MONOCYTES # BLD: 0.2 K/UL (ref 0–1.3)
MONOCYTES NFR BLD: 6.2 %
NEUTROPHILS # BLD: 1.5 K/UL (ref 1.7–7.7)
NEUTROPHILS NFR BLD: 57.1 %
PLATELET # BLD AUTO: 152 K/UL (ref 135–450)
PMV BLD AUTO: 9.4 FL (ref 5–10.5)
POTASSIUM SERPL-SCNC: 5.7 MMOL/L (ref 3.5–5.1)
PROT SERPL-MCNC: 6.2 G/DL (ref 6.4–8.2)
RBC # BLD AUTO: 4.23 M/UL (ref 4–5.2)
SODIUM SERPL-SCNC: 142 MMOL/L (ref 136–145)
TRIGL SERPL-MCNC: 32 MG/DL (ref 0–150)
TSH SERPL DL<=0.005 MIU/L-ACNC: 0.83 UIU/ML (ref 0.27–4.2)
VLDLC SERPL CALC-MCNC: 6 MG/DL
WBC # BLD AUTO: 2.6 K/UL (ref 4–11)

## 2023-10-02 PROCEDURE — 36415 COLL VENOUS BLD VENIPUNCTURE: CPT | Performed by: FAMILY MEDICINE

## 2023-10-02 NOTE — PROGRESS NOTES
Pt in today for Fasting labs. 2 SST, 1 LAV  collected.    right arm    tolerated the procedure well with no complications

## 2023-10-08 ASSESSMENT — PATIENT HEALTH QUESTIONNAIRE - PHQ9
2. FEELING DOWN, DEPRESSED OR HOPELESS: NOT AT ALL
SUM OF ALL RESPONSES TO PHQ QUESTIONS 1-9: 0
1. LITTLE INTEREST OR PLEASURE IN DOING THINGS: 0
2. FEELING DOWN, DEPRESSED OR HOPELESS: 0
SUM OF ALL RESPONSES TO PHQ QUESTIONS 1-9: 0
SUM OF ALL RESPONSES TO PHQ9 QUESTIONS 1 & 2: 0
SUM OF ALL RESPONSES TO PHQ9 QUESTIONS 1 & 2: 0
SUM OF ALL RESPONSES TO PHQ QUESTIONS 1-9: 0
SUM OF ALL RESPONSES TO PHQ QUESTIONS 1-9: 0
1. LITTLE INTEREST OR PLEASURE IN DOING THINGS: NOT AT ALL

## 2023-10-11 ENCOUNTER — OFFICE VISIT (OUTPATIENT)
Dept: FAMILY MEDICINE CLINIC | Age: 53
End: 2023-10-11
Payer: COMMERCIAL

## 2023-10-11 VITALS
BODY MASS INDEX: 18.68 KG/M2 | SYSTOLIC BLOOD PRESSURE: 110 MMHG | HEIGHT: 67 IN | DIASTOLIC BLOOD PRESSURE: 80 MMHG | WEIGHT: 119 LBS

## 2023-10-11 DIAGNOSIS — Z00.00 WELL ADULT EXAM: Primary | ICD-10-CM

## 2023-10-11 PROCEDURE — 99396 PREV VISIT EST AGE 40-64: CPT | Performed by: FAMILY MEDICINE

## 2023-10-11 ASSESSMENT — ENCOUNTER SYMPTOMS
RESPIRATORY NEGATIVE: 1
GASTROINTESTINAL NEGATIVE: 1

## 2023-11-26 SDOH — HEALTH STABILITY: PHYSICAL HEALTH: ON AVERAGE, HOW MANY DAYS PER WEEK DO YOU ENGAGE IN MODERATE TO STRENUOUS EXERCISE (LIKE A BRISK WALK)?: 7 DAYS

## 2023-11-26 SDOH — HEALTH STABILITY: PHYSICAL HEALTH: ON AVERAGE, HOW MANY MINUTES DO YOU ENGAGE IN EXERCISE AT THIS LEVEL?: 50 MIN

## 2023-11-29 ENCOUNTER — OFFICE VISIT (OUTPATIENT)
Dept: ORTHOPEDIC SURGERY | Age: 53
End: 2023-11-29
Payer: COMMERCIAL

## 2023-11-29 VITALS — WEIGHT: 119 LBS | HEIGHT: 67 IN | BODY MASS INDEX: 18.68 KG/M2

## 2023-11-29 DIAGNOSIS — M67.912 ROTATOR CUFF DISORDER, LEFT: ICD-10-CM

## 2023-11-29 DIAGNOSIS — M25.512 LEFT SHOULDER PAIN, UNSPECIFIED CHRONICITY: Primary | ICD-10-CM

## 2023-11-29 PROCEDURE — 99214 OFFICE O/P EST MOD 30 MIN: CPT | Performed by: ORTHOPAEDIC SURGERY

## 2023-11-29 NOTE — PROGRESS NOTES
Chief Complaint    New Patient (Op/NP left shoulder )      History of Present Illness:  Wing Porter is a pleasant,  47 y.o. female here today for evaluation of her left shoulder. This patient is known to our office. She underwent a right shoulder rotator cuff repair and open biceps tenodesis on 8/1/2022 by Dr. Mike Noriega. She states this shoulder can be \"pinchy\" at times but overall is doing very well. In regards to her left shoulder, she has had pain for several weeks with no inciting injury. She first noticed the pain when she was doing yardwork. She denies pain at night. She endorses deltoid referred pain and pain with overhead movements. She states previous workup in the form of an MRI has shown a partial tear of her rotator cuff of the left shoulder. Medical History:    No notes on file    Patient's medications, allergies, past medical, surgical, social and family histories were reviewed and updated as appropriate. Past Medical History:   Diagnosis Date    Allergic     Neuroma     Staph skin infection Oct 2012    knee      Social History     Socioeconomic History    Marital status:       Spouse name: Not on file    Number of children: Not on file    Years of education: Not on file    Highest education level: Not on file   Occupational History    Not on file   Tobacco Use    Smoking status: Never    Smokeless tobacco: Never    Tobacco comments:     encouraged to never smoke    Vaping Use    Vaping Use: Never used   Substance and Sexual Activity    Alcohol use: Yes     Comment: 1-2 times weekly    Drug use: No    Sexual activity: Not on file   Other Topics Concern    Not on file   Social History Narrative    Not on file     Social Determinants of Health     Financial Resource Strain: Not on file   Food Insecurity: Not on file   Transportation Needs: Not on file   Physical Activity: Sufficiently Active (11/26/2023)    Exercise Vital Sign     Days of Exercise per Week: 7 days     Minutes

## 2023-12-14 ENCOUNTER — TELEPHONE (OUTPATIENT)
Dept: ORTHOPEDIC SURGERY | Age: 53
End: 2023-12-14

## 2023-12-14 ENCOUNTER — OFFICE VISIT (OUTPATIENT)
Dept: ORTHOPEDIC SURGERY | Age: 53
End: 2023-12-14
Payer: COMMERCIAL

## 2023-12-14 VITALS — HEIGHT: 67 IN | WEIGHT: 119 LBS | BODY MASS INDEX: 18.68 KG/M2

## 2023-12-14 DIAGNOSIS — M67.922 DISORDER OF TENDON OF BICEPS, LEFT: Primary | ICD-10-CM

## 2023-12-14 DIAGNOSIS — M25.812 IMPINGEMENT OF LEFT SHOULDER: ICD-10-CM

## 2023-12-14 PROCEDURE — 99214 OFFICE O/P EST MOD 30 MIN: CPT | Performed by: ORTHOPAEDIC SURGERY

## 2023-12-14 NOTE — TELEPHONE ENCOUNTER
CPT: [id number]  AUTH: [employer] PER WEBSITE  INSURANCE: [employer]  LOCATION [facility]  AREA OF SX LT SHLD  NOTE: DOC SCANNED

## 2023-12-14 NOTE — PROGRESS NOTES
rim-rent tear. 2. Medially perched long biceps tendon against a frayed subscapularis. Assessment:  Martin Madden is a 47 y.o. female with left biceps instability and rotator cuff tendinosis and impingement, appreciated both on MRI and physical exam.    Impression:  Encounter Diagnoses   Name Primary? Disorder of tendon of biceps, left Yes    Impingement of left shoulder        Office Procedures:  No orders of the defined types were placed in this encounter. Plan:   Martin Madden presents today  for follow-up of her left shoulder MRI. At her last visit there was concern for rotator cuff and biceps pathology. Her MRI indicates she has biceps instability and this is seen on exam as well with positive speeds and [place]'s test as well as tenderness to palpation over the bicipital groove. She also has exquisite tenderness over the cuff - the MRI demonstrates tendinosis and impingement but no cuff tear. We gave her options including conservative and surgical treatment of her condition. Conservative treatment would encompass physical therapy and a corticosteroid injection. However, we recommended that she may do better from a surgical standpoint as it is a structural problem in her biceps leading to its instability which is unlikely to improve with conservative treatment. She had surgery on the right shoulder for the same condition and has done extremely well, therefore, she prefers to proceed with surgery on her left shoulder for left diagnostic arthroscopy with subacromial decompression, evaluation of the rotator cuff and biceps tenodesis. The risk, benefits and alternative treatment options for biceps pathology. We also went over the possible therapy schedule with her and stated this may change depending on what pathology findings surgery and if her cuff is indeed torn requiring repair. She will schedule this at her earliest convenience.       She was in agreement with this plan and all

## 2023-12-14 NOTE — TELEPHONE ENCOUNTER
General Question     Subject: WORK NOTE   Patient and /or Facility Request: Margy Sands  Contact Number: [phone]    PATIENT CALLED IN TO SEE IF SHE CAN GET AN WORK NOTE TO STATE THAT SHE WAS AT HER APPT TODAY.    [facility]   FAX: [phone] AND EMAIL TO HER CURRENT EMAIL ON FILE...    PLEASE ADVISE

## 2023-12-14 NOTE — H&P (VIEW-ONLY)
questions were answered to the patient's satisfaction. She was encouraged to call with any questions. 12/14/2023  9:48 AM      Claribel Montalvo MD  Orthopedic Surgery Sports Medicine Fellow    This dictation was performed with a verbal recognition program [facility]) and it was checked for errors. It is possible that there are still dictated errors within this office note. If so, please bring any errors to my attention for an addendum. All efforts were made to ensure that this office note is accurate.   _________________  I was physically present and personally supervised the Orthopaedic Sports Medicine Fellow in the evaluation and development of a treatment plan for this patient. I personally interviewed the patient and performed a physical examination. In addition, I discussed the patient's condition and treatment options with them. I have also reviewed and agree with the past medical, family and social history unless otherwise noted. All of the patient's questions were answered. João Alexandra MD, PhD  12/14/2023

## 2023-12-26 ENCOUNTER — ANESTHESIA EVENT (OUTPATIENT)
Dept: OPERATING ROOM | Age: 53
End: 2023-12-26
Payer: COMMERCIAL

## 2023-12-26 ENCOUNTER — HOSPITAL ENCOUNTER (OUTPATIENT)
Age: 53
Setting detail: OUTPATIENT SURGERY
Discharge: HOME | End: 2023-12-26
Attending: ORTHOPAEDIC SURGERY | Admitting: ORTHOPAEDIC SURGERY
Payer: COMMERCIAL

## 2023-12-26 ENCOUNTER — ANESTHESIA (OUTPATIENT)
Dept: OPERATING ROOM | Age: 53
End: 2023-12-26
Payer: COMMERCIAL

## 2023-12-26 VITALS
OXYGEN SATURATION: 98 % | SYSTOLIC BLOOD PRESSURE: 114 MMHG | TEMPERATURE: 97.1 F | HEIGHT: 67 IN | WEIGHT: 120 LBS | BODY MASS INDEX: 18.83 KG/M2 | HEART RATE: 60 BPM | DIASTOLIC BLOOD PRESSURE: 79 MMHG | RESPIRATION RATE: 14 BRPM

## 2023-12-26 DIAGNOSIS — Z98.890 STATUS POST SHOULDER SURGERY: Primary | ICD-10-CM

## 2023-12-26 PROCEDURE — 2580000003 HC RX 258: Performed by: ANESTHESIOLOGY

## 2023-12-26 PROCEDURE — 3600000004 HC SURGERY LEVEL 4 BASE: Performed by: ORTHOPAEDIC SURGERY

## 2023-12-26 PROCEDURE — 7100000000 HC PACU RECOVERY - FIRST 15 MIN: Performed by: ORTHOPAEDIC SURGERY

## 2023-12-26 PROCEDURE — 2500000003 HC RX 250 WO HCPCS: Performed by: ORTHOPAEDIC SURGERY

## 2023-12-26 PROCEDURE — 6360000002 HC RX W HCPCS: Performed by: ANESTHESIOLOGY

## 2023-12-26 PROCEDURE — 2500000003 HC RX 250 WO HCPCS: Performed by: NURSE ANESTHETIST, CERTIFIED REGISTERED

## 2023-12-26 PROCEDURE — 3700000000 HC ANESTHESIA ATTENDED CARE: Performed by: ORTHOPAEDIC SURGERY

## 2023-12-26 PROCEDURE — 6360000002 HC RX W HCPCS: Performed by: NURSE ANESTHETIST, CERTIFIED REGISTERED

## 2023-12-26 PROCEDURE — 3700000001 HC ADD 15 MINUTES (ANESTHESIA): Performed by: ORTHOPAEDIC SURGERY

## 2023-12-26 PROCEDURE — 7100000001 HC PACU RECOVERY - ADDTL 15 MIN: Performed by: ORTHOPAEDIC SURGERY

## 2023-12-26 PROCEDURE — C1713 ANCHOR/SCREW BN/BN,TIS/BN: HCPCS | Performed by: ORTHOPAEDIC SURGERY

## 2023-12-26 PROCEDURE — 3600000014 HC SURGERY LEVEL 4 ADDTL 15MIN: Performed by: ORTHOPAEDIC SURGERY

## 2023-12-26 PROCEDURE — 2720000010 HC SURG SUPPLY STERILE: Performed by: ORTHOPAEDIC SURGERY

## 2023-12-26 PROCEDURE — 6360000002 HC RX W HCPCS: Performed by: ORTHOPAEDIC SURGERY

## 2023-12-26 PROCEDURE — C9290 INJ, BUPIVACAINE LIPOSOME: HCPCS | Performed by: ANESTHESIOLOGY

## 2023-12-26 PROCEDURE — 2709999900 HC NON-CHARGEABLE SUPPLY: Performed by: ORTHOPAEDIC SURGERY

## 2023-12-26 PROCEDURE — 7100000010 HC PHASE II RECOVERY - FIRST 15 MIN: Performed by: ORTHOPAEDIC SURGERY

## 2023-12-26 PROCEDURE — 64415 NJX AA&/STRD BRCH PLXS IMG: CPT | Performed by: ANESTHESIOLOGY

## 2023-12-26 PROCEDURE — 2580000003 HC RX 258: Performed by: ORTHOPAEDIC SURGERY

## 2023-12-26 PROCEDURE — 7100000011 HC PHASE II RECOVERY - ADDTL 15 MIN: Performed by: ORTHOPAEDIC SURGERY

## 2023-12-26 DEVICE — SCREW INTRF L15MM DIA4.75MM W/ SZ 2 FIBERWIRE SUT AND DISP: Type: IMPLANTABLE DEVICE | Site: SHOULDER | Status: FUNCTIONAL

## 2023-12-26 DEVICE — ANCHOR SUTURE BIOCOMP 4.75X19.1 MM SWIVELOCK C: Type: IMPLANTABLE DEVICE | Site: SHOULDER | Status: FUNCTIONAL

## 2023-12-26 RX ORDER — METOCLOPRAMIDE HYDROCHLORIDE 5 MG/ML
10 INJECTION INTRAMUSCULAR; INTRAVENOUS
Status: DISCONTINUED | OUTPATIENT
Start: 2023-12-26 | End: 2023-12-26 | Stop reason: HOSPADM

## 2023-12-26 RX ORDER — ONDANSETRON 4 MG/1
4 TABLET, FILM COATED ORAL 3 TIMES DAILY PRN
Qty: 15 TABLET | Refills: 0 | Status: SHIPPED | OUTPATIENT
Start: 2023-12-26

## 2023-12-26 RX ORDER — ONDANSETRON 2 MG/ML
INJECTION INTRAMUSCULAR; INTRAVENOUS PRN
Status: DISCONTINUED | OUTPATIENT
Start: 2023-12-26 | End: 2023-12-26 | Stop reason: SDUPTHER

## 2023-12-26 RX ORDER — PROPOFOL 10 MG/ML
INJECTION, EMULSION INTRAVENOUS PRN
Status: DISCONTINUED | OUTPATIENT
Start: 2023-12-26 | End: 2023-12-26 | Stop reason: SDUPTHER

## 2023-12-26 RX ORDER — DEXAMETHASONE SODIUM PHOSPHATE 4 MG/ML
INJECTION, SOLUTION INTRA-ARTICULAR; INTRALESIONAL; INTRAMUSCULAR; INTRAVENOUS; SOFT TISSUE PRN
Status: DISCONTINUED | OUTPATIENT
Start: 2023-12-26 | End: 2023-12-26 | Stop reason: SDUPTHER

## 2023-12-26 RX ORDER — SODIUM CHLORIDE 9 MG/ML
INJECTION, SOLUTION INTRAVENOUS PRN
Status: DISCONTINUED | OUTPATIENT
Start: 2023-12-26 | End: 2023-12-26 | Stop reason: HOSPADM

## 2023-12-26 RX ORDER — LABETALOL HYDROCHLORIDE 5 MG/ML
10 INJECTION, SOLUTION INTRAVENOUS
Status: DISCONTINUED | OUTPATIENT
Start: 2023-12-26 | End: 2023-12-26 | Stop reason: HOSPADM

## 2023-12-26 RX ORDER — HYDROMORPHONE HYDROCHLORIDE 1 MG/ML
0.25 INJECTION, SOLUTION INTRAMUSCULAR; INTRAVENOUS; SUBCUTANEOUS EVERY 5 MIN PRN
Status: DISCONTINUED | OUTPATIENT
Start: 2023-12-26 | End: 2023-12-26 | Stop reason: HOSPADM

## 2023-12-26 RX ORDER — MIDAZOLAM HYDROCHLORIDE 1 MG/ML
INJECTION INTRAMUSCULAR; INTRAVENOUS PRN
Status: DISCONTINUED | OUTPATIENT
Start: 2023-12-26 | End: 2023-12-26 | Stop reason: SDUPTHER

## 2023-12-26 RX ORDER — MIDAZOLAM HYDROCHLORIDE 1 MG/ML
2 INJECTION INTRAMUSCULAR; INTRAVENOUS ONCE
Status: DISCONTINUED | OUTPATIENT
Start: 2023-12-26 | End: 2023-12-26 | Stop reason: HOSPADM

## 2023-12-26 RX ORDER — ROCURONIUM BROMIDE 10 MG/ML
INJECTION, SOLUTION INTRAVENOUS PRN
Status: DISCONTINUED | OUTPATIENT
Start: 2023-12-26 | End: 2023-12-26 | Stop reason: SDUPTHER

## 2023-12-26 RX ORDER — BUPIVACAINE HYDROCHLORIDE 5 MG/ML
INJECTION, SOLUTION EPIDURAL; INTRACAUDAL
Status: COMPLETED | OUTPATIENT
Start: 2023-12-26 | End: 2023-12-26

## 2023-12-26 RX ORDER — GLYCOPYRROLATE 0.2 MG/ML
INJECTION INTRAMUSCULAR; INTRAVENOUS PRN
Status: DISCONTINUED | OUTPATIENT
Start: 2023-12-26 | End: 2023-12-26 | Stop reason: SDUPTHER

## 2023-12-26 RX ORDER — OXYCODONE HYDROCHLORIDE AND ACETAMINOPHEN 5; 325 MG/1; MG/1
1 TABLET ORAL EVERY 6 HOURS PRN
Qty: 28 TABLET | Refills: 0 | Status: SHIPPED | OUTPATIENT
Start: 2023-12-26 | End: 2024-01-02

## 2023-12-26 RX ORDER — ASPIRIN 81 MG/1
81 TABLET ORAL 2 TIMES DAILY
Qty: 28 TABLET | Refills: 0 | Status: SHIPPED | OUTPATIENT
Start: 2023-12-26

## 2023-12-26 RX ORDER — BUPIVACAINE HYDROCHLORIDE 5 MG/ML
INJECTION, SOLUTION EPIDURAL; INTRACAUDAL PRN
Status: DISCONTINUED | OUTPATIENT
Start: 2023-12-26 | End: 2023-12-26 | Stop reason: HOSPADM

## 2023-12-26 RX ORDER — FENTANYL CITRATE 50 UG/ML
INJECTION, SOLUTION INTRAMUSCULAR; INTRAVENOUS PRN
Status: DISCONTINUED | OUTPATIENT
Start: 2023-12-26 | End: 2023-12-26 | Stop reason: SDUPTHER

## 2023-12-26 RX ORDER — EPHEDRINE SULFATE 50 MG/ML
INJECTION INTRAVENOUS PRN
Status: DISCONTINUED | OUTPATIENT
Start: 2023-12-26 | End: 2023-12-26 | Stop reason: SDUPTHER

## 2023-12-26 RX ORDER — SODIUM CHLORIDE 0.9 % (FLUSH) 0.9 %
5-40 SYRINGE (ML) INJECTION PRN
Status: DISCONTINUED | OUTPATIENT
Start: 2023-12-26 | End: 2023-12-26 | Stop reason: HOSPADM

## 2023-12-26 RX ORDER — SODIUM CHLORIDE 0.9 % (FLUSH) 0.9 %
5-40 SYRINGE (ML) INJECTION EVERY 12 HOURS SCHEDULED
Status: DISCONTINUED | OUTPATIENT
Start: 2023-12-26 | End: 2023-12-26 | Stop reason: HOSPADM

## 2023-12-26 RX ORDER — SENNOSIDES A AND B 8.6 MG/1
1 TABLET, FILM COATED ORAL DAILY
Qty: 30 TABLET | Refills: 0 | Status: SHIPPED | OUTPATIENT
Start: 2023-12-26

## 2023-12-26 RX ORDER — BUPIVACAINE HYDROCHLORIDE 5 MG/ML
30 INJECTION, SOLUTION EPIDURAL; INTRACAUDAL ONCE
Status: DISCONTINUED | OUTPATIENT
Start: 2023-12-26 | End: 2023-12-26 | Stop reason: HOSPADM

## 2023-12-26 RX ORDER — SODIUM CHLORIDE, SODIUM LACTATE, POTASSIUM CHLORIDE, CALCIUM CHLORIDE 600; 310; 30; 20 MG/100ML; MG/100ML; MG/100ML; MG/100ML
INJECTION, SOLUTION INTRAVENOUS CONTINUOUS
Status: DISCONTINUED | OUTPATIENT
Start: 2023-12-26 | End: 2023-12-26 | Stop reason: HOSPADM

## 2023-12-26 RX ORDER — MIDAZOLAM HYDROCHLORIDE 1 MG/ML
2 INJECTION INTRAMUSCULAR; INTRAVENOUS
Status: DISCONTINUED | OUTPATIENT
Start: 2023-12-26 | End: 2023-12-26 | Stop reason: HOSPADM

## 2023-12-26 RX ORDER — OXYCODONE HYDROCHLORIDE AND ACETAMINOPHEN 5; 325 MG/1; MG/1
1 TABLET ORAL ONCE AS NEEDED
Status: CANCELLED | OUTPATIENT
Start: 2023-12-26

## 2023-12-26 RX ORDER — ONDANSETRON 2 MG/ML
4 INJECTION INTRAMUSCULAR; INTRAVENOUS
Status: DISCONTINUED | OUTPATIENT
Start: 2023-12-26 | End: 2023-12-26 | Stop reason: HOSPADM

## 2023-12-26 RX ORDER — LIDOCAINE HYDROCHLORIDE 20 MG/ML
INJECTION, SOLUTION INTRAVENOUS PRN
Status: DISCONTINUED | OUTPATIENT
Start: 2023-12-26 | End: 2023-12-26 | Stop reason: SDUPTHER

## 2023-12-26 RX ORDER — FENTANYL CITRATE 50 UG/ML
100 INJECTION, SOLUTION INTRAMUSCULAR; INTRAVENOUS ONCE
Status: DISCONTINUED | OUTPATIENT
Start: 2023-12-26 | End: 2023-12-26 | Stop reason: HOSPADM

## 2023-12-26 RX ORDER — FENTANYL CITRATE 50 UG/ML
50 INJECTION, SOLUTION INTRAMUSCULAR; INTRAVENOUS EVERY 5 MIN PRN
Status: DISCONTINUED | OUTPATIENT
Start: 2023-12-26 | End: 2023-12-26 | Stop reason: HOSPADM

## 2023-12-26 RX ADMIN — BUPIVACAINE 10 ML: 13.3 INJECTION, SUSPENSION, LIPOSOMAL INFILTRATION at 07:09

## 2023-12-26 RX ADMIN — SODIUM CHLORIDE, POTASSIUM CHLORIDE, SODIUM LACTATE AND CALCIUM CHLORIDE: 600; 310; 30; 20 INJECTION, SOLUTION INTRAVENOUS at 06:30

## 2023-12-26 RX ADMIN — EPHEDRINE SULFATE 5 MG: 50 INJECTION INTRAVENOUS at 08:13

## 2023-12-26 RX ADMIN — VANCOMYCIN HYDROCHLORIDE 750 MG: 10 INJECTION, POWDER, LYOPHILIZED, FOR SOLUTION INTRAVENOUS at 07:53

## 2023-12-26 RX ADMIN — MIDAZOLAM HYDROCHLORIDE 1 MG: 2 INJECTION, SOLUTION INTRAMUSCULAR; INTRAVENOUS at 07:30

## 2023-12-26 RX ADMIN — DEXAMETHASONE SODIUM PHOSPHATE 4 MG: 4 INJECTION, SOLUTION INTRAMUSCULAR; INTRAVENOUS at 07:53

## 2023-12-26 RX ADMIN — FENTANYL CITRATE 50 MCG: 50 INJECTION, SOLUTION INTRAMUSCULAR; INTRAVENOUS at 07:05

## 2023-12-26 RX ADMIN — MIDAZOLAM HYDROCHLORIDE 1 MG: 2 INJECTION, SOLUTION INTRAMUSCULAR; INTRAVENOUS at 07:05

## 2023-12-26 RX ADMIN — PROPOFOL 100 MG: 10 INJECTION, EMULSION INTRAVENOUS at 07:39

## 2023-12-26 RX ADMIN — SUGAMMADEX 150 MG: 100 INJECTION, SOLUTION INTRAVENOUS at 09:41

## 2023-12-26 RX ADMIN — LIDOCAINE HYDROCHLORIDE 50 MG: 20 INJECTION, SOLUTION INTRAVENOUS at 09:35

## 2023-12-26 RX ADMIN — ROCURONIUM BROMIDE 40 MG: 10 INJECTION, SOLUTION INTRAVENOUS at 07:40

## 2023-12-26 RX ADMIN — GLYCOPYRROLATE 0.2 MG: 0.2 INJECTION INTRAMUSCULAR; INTRAVENOUS at 07:51

## 2023-12-26 RX ADMIN — FENTANYL CITRATE 50 MCG: 50 INJECTION, SOLUTION INTRAMUSCULAR; INTRAVENOUS at 07:39

## 2023-12-26 RX ADMIN — ROCURONIUM BROMIDE 30 MG: 10 INJECTION, SOLUTION INTRAVENOUS at 08:47

## 2023-12-26 RX ADMIN — BUPIVACAINE HYDROCHLORIDE 20 ML: 5 INJECTION, SOLUTION EPIDURAL; INTRACAUDAL; PERINEURAL at 07:09

## 2023-12-26 RX ADMIN — LIDOCAINE HYDROCHLORIDE 50 MG: 20 INJECTION, SOLUTION INTRAVENOUS at 07:39

## 2023-12-26 RX ADMIN — ONDANSETRON 4 MG: 2 INJECTION INTRAMUSCULAR; INTRAVENOUS at 07:39

## 2023-12-26 RX ADMIN — SODIUM CHLORIDE, POTASSIUM CHLORIDE, SODIUM LACTATE AND CALCIUM CHLORIDE: 600; 310; 30; 20 INJECTION, SOLUTION INTRAVENOUS at 08:20

## 2023-12-26 NOTE — ANESTHESIA PROCEDURE NOTES
Peripheral Block    Patient location during procedure: pre-op  Reason for block: post-op pain management and at surgeon's request  Start time: 12/26/2023 7:09 AM  End time: 12/26/2023 7:13 AM  Staffing  Performed: anesthesiologist   Anesthesiologist: Deloris Morgan MD  Performed by: Deloris Morgan MD  Authorized by: Deloris Morgan MD    Preanesthetic Checklist  Completed: patient identified, IV checked, site marked, risks and benefits discussed, surgical/procedural consents, equipment checked, pre-op evaluation, timeout performed, anesthesia consent given, oxygen available and monitors applied/VS acknowledged  Peripheral Block   Patient position: sitting  Prep: ChloraPrep  Provider prep: mask and sterile gloves  Patient monitoring: cardiac monitor, continuous pulse ox, frequent blood pressure checks and IV access  Block type: Brachial plexus  Interscalene  Laterality: left  Injection technique: single-shot  Guidance: ultrasound guided  Local infiltration: lidocaine  Infiltration strength: 1 %  Local infiltration: lidocaine  Dose: 3 mL    Needle   Needle gauge: 21 G  Needle localization: ultrasound guidance  Needle length: 10 cm  Assessment   Injection assessment: negative aspiration for heme, no paresthesia on injection and local visualized surrounding nerve on ultrasound  Paresthesia pain: none  Slow fractionated injection: yes  Hemodynamics: stable  Real-time US image taken/store: yes  Outcomes: uncomplicated    Additional Notes  Immediately prior to procedure a \"time out\" was called to verify the correct patient, allergies, laterality, procedure and equipment. Time out performed with  RN    Local Anesthetic: 20 cc 0.5 %  Bupivacaine + 10 cc Exparel   Amount: 30 ml  in 5 ml increments after negative aspiration each time.     Anterior scalene and middle scalene muscles, upper, middle and lower trunks of the brachial plexus are identified, the tip of the needle and the spread of the local anesthetic around the brachial

## 2023-12-26 NOTE — ANESTHESIA POSTPROCEDURE EVALUATION
Department of Anesthesiology  Postprocedure Note    Patient: Isabella Powell  MRN: [id number]  YOB: 1970  Date of evaluation: 12/26/2023    Procedure Summary       Date: 12/26/23 Room / Location: [address] [address] Replaced by [facility]    Anesthesia Start: 0730 Anesthesia Stop: 5050    Procedure: LEFT SHOULDER ARTHROSCOPY, DEBRIDEMENT DECOMPRESSION, OPEN BICEPS TENODESIS, EXAM UNDER ANESTHESIA, ROTATOR CUFF REPAIR, DIAGNOSTIC ATHROSCOPY (Left) Diagnosis:       Disorder of tendon of biceps, left      (Disorder of tendon of biceps, left [N23.734])    Surgeons: Laith Thurman MD Responsible Provider: Carlitos Doyle MD    Anesthesia Type: regional, general ASA Status: 2            Anesthesia Type: No value filed. Ward Phase I: Ward Score: 8    Ward Phase II:      Anesthesia Post Evaluation    Patient location during evaluation: PACU  Level of consciousness: awake and alert  Airway patency: patent  Nausea & Vomiting: no vomiting  Cardiovascular status: blood pressure returned to baseline  Respiratory status: acceptable  Hydration status: euvolemic  Multimodal analgesia pain management approach  Pain management: adequate    No notable events documented.

## 2023-12-26 NOTE — INTERVAL H&P NOTE
Update History & Physical    The patient's History and Physical of December 14, 2023 was reviewed with the patient and I examined the patient. There was no change. The surgical site was confirmed by the patient and me. Plan: The risks, benefits, expected outcome, and alternative to the recommended procedure have been discussed with the patient. Patient understands and wants to proceed with the procedure.      Electronically signed by Kita Shirley MD on 12/26/2023 at 7:13 AM 55

## 2023-12-26 NOTE — BRIEF OP NOTE
Brief Postoperative Note      Patient: Jo-Ann Chino  YOB: 1970  MRN: [id number]    Date of Procedure: 12/26/2023    Pre-Op Diagnosis Codes:     * Disorder of tendon of biceps, left [M67.922]    Post-Op Diagnosis: Same       Procedure(s):  LEFT SHOULDER ARTHROSCOPY, DEBRIDEMENT DECOMPRESSION, OPEN BICEPS TENODESIS, EXAM UNDER ANESTHESIA, ROTATOR CUFF REPAIR, DIAGNOSTIC ATHROSCOPY    Surgeon(s):  Erika Schmidt MD    Assistant:  Fellow: Crystal Pearson MD    Anesthesia: General    Estimated Blood Loss (mL): Minimal    Complications: None    Specimens:   * No specimens in log *    Implants:  * No implants in log *      Drains: * No LDAs found *    Findings: left small high grade partial thickness tear, degenerative biceps tear      Electronically signed by Crystal Pearson MD on 12/26/2023 at 9:30 AM

## 2023-12-27 ENCOUNTER — TELEPHONE (OUTPATIENT)
Dept: ORTHOPEDIC SURGERY | Age: 53
End: 2023-12-27

## 2023-12-27 NOTE — TELEPHONE ENCOUNTER
General Question     Subject: PLEASE CALL TO GO OVER PO DISCUSSION   Patient and /or Facility Request: Margy Sands  Contact Number: [phone]    Pt ASKING SOMEONE TO CALL HER AND GO OVER THE PO INSTRUCTIONS AND WHAT WAS DONE. SHE WAS SEDATED AND IS NOT SURE OF MUCH THAT TOOK PLACE.     Did DR GIMENEZ HAVE TO REPAIR THE ROTATOR CUFF?  WHAT ELSE WAS DONE? PLEASE GO OVER ALL THE DETAILS.

## 2023-12-27 NOTE — OP NOTE
[facility] [facility]               [address]                                OPERATIVE REPORT    PATIENT NAME: Dillan Enriquez                  :        1970  MED REC NO:   [id number]                          ROOM:  ACCOUNT NO:   [de-identified]                           ADMIT DATE: 2023  PROVIDER:     Jose Dalton MD    DATE OF PROCEDURE:  2023    PREOPERATIVE DIAGNOSES:  Left shoulder impingement, biceps tendinopathy  and partial thickness rotator cuff tear. POSTOPERATIVE DIAGNOSES:  Left shoulder impingement, biceps tendinopathy  and partial thickness rotator cuff tear with high-grade partial  thickness bursal-sided rotator cuff tear and biceps tendinopathy  and partial tearing. PROCEDURES:  Left shoulder exam under anesthesia; diagnostic  arthroscopy; arthroscopic extensive debridement of labrum, rotator  interval, subacromial bursa, rotator cuff; arthroscopic subacromial  decompressive acromioplasty; arthroscopic rotator cuff repair; open  subpectoral biceps tenodesis. ANESTHESIA:  General anesthesia, interscalene block. IV FLUIDS:  1300 mL of crystalloids. ESTIMATED BLOOD LOSS:  25 mL. COMPLICATIONS:  None. SURGEON:  Jose Dalton MD    ASSISTANT:  Claribel Montalvo MD.  The availability of Dr. Marcel Lefort, skilled  first assistant was critically important as he had helped with holding  the arthroscope during knot tying and also assisted with exposure during  biceps tenodesis. IMPLANTS:  Arthrex BioComposite SwiveLock 4.75 mm x1, Arthrex  BioComposite corkscrew anchor, BioComposite tenodesis screw 4.75 mm x1. FINDINGS:  Examination under anesthesia revealed no focal motion  deficit, no hyperlaxity. Diagnostic arthroscopy revealed a healthy  glenohumeral joint. There was a type 2 SLAP tear, biceps tendinopathy,  partial tearing towards the posterior interval partial thickness rotator  cuff tear.   There is

## 2023-12-28 ENCOUNTER — TELEPHONE (OUTPATIENT)
Dept: ORTHOPEDIC SURGERY | Age: 53
End: 2023-12-28

## 2023-12-28 NOTE — TELEPHONE ENCOUNTER
General Question     Subject: PO QUESTIONS  Patient and /or Facility Request: Margy Sands  Contact Number: [phone]    PT REQ CLL BK FROM SARATHSELIN PAGE SOME PO CONCERNS DIDN'T WANT TO GO INTO DETAIL  PLEASE CLL PT TO ADV

## 2024-01-03 ENCOUNTER — OFFICE VISIT (OUTPATIENT)
Dept: ORTHOPEDIC SURGERY | Age: 54
End: 2024-01-03

## 2024-01-03 VITALS — WEIGHT: 120 LBS | HEIGHT: 67 IN | BODY MASS INDEX: 18.83 KG/M2

## 2024-01-03 DIAGNOSIS — Z98.890 S/P ROTATOR CUFF REPAIR: Primary | ICD-10-CM

## 2024-01-03 PROCEDURE — 99024 POSTOP FOLLOW-UP VISIT: CPT | Performed by: PHYSICIAN ASSISTANT

## 2024-01-03 NOTE — PROGRESS NOTES
History of Present Illness:  Margy Sands is a 53 y.o. female who presents for a post operative visit.  The patient underwent a left arthroscopic rotator cuff repair with open subpectoral biceps tenodesis on 12/26/2023 by Dr. João Baez.  Overall she is doing okay and feels that their pain is well controlled with current pain medications.  She has been compliant with wearing the UltraSling brace at all times.    The patient deny fevers, chills, numbness, tingling, and shortness of breath.    Medical History:  Patient's medications, allergies, past medical, surgical, social and family histories were reviewed and updated as appropriate.    No notes on file    Review of Systems  A 14 point review of systems was completed by the patient is available in the media section of the scanned medical record and was reviewed on 1/3/2024.      Vital Signs:  There were no vitals filed for this visit.    General/Appearance: Alert and oriented and in no apparent distress.    Skin:  There are no skin lesions, cellulitis, or extreme edema. The patient has warm and well-perfused Bilateral upper extremities with brisk capillary refill.      left Shoulder Exam:    Inspection: left shoulder incision that is clean, dry and intact and well approximated. The Prineo dressing is still in place.  Mild ecchymosis and swelling are present as can be expected. There is no erythema, drainage or other signs of infection    Palpation:  No crepitus to gentle motion    Active Range of Motion: Deferred    Passive Range of Motion: Forward elevation of 30 and external rotation of 5    Strength:  Deferred    Special Tests:  Deferred.    Neurovascular: Sensation to light touch is intact, no motor deficits, palpable radial pulses 2+    Radiology:     Plain radiographs not obtained today.         Assessment :  Ms. Margy Sands is a 53 y.o. patient who underwent a left rthroscopic rotator cuff repair with open subpectoral biceps tenodesis on

## 2024-01-15 ENCOUNTER — OFFICE VISIT (OUTPATIENT)
Dept: ORTHOPEDIC SURGERY | Age: 54
End: 2024-01-15

## 2024-01-15 VITALS — BODY MASS INDEX: 18.83 KG/M2 | HEIGHT: 67 IN | WEIGHT: 120 LBS

## 2024-01-15 DIAGNOSIS — Z98.890 S/P ROTATOR CUFF REPAIR: Primary | ICD-10-CM

## 2024-01-15 PROCEDURE — 99024 POSTOP FOLLOW-UP VISIT: CPT | Performed by: PHYSICIAN ASSISTANT

## 2024-01-15 NOTE — PROGRESS NOTES
History of Present Illness:  Margy Sands is a 53 y.o. female who presents for a post operative visit.  The patient is 3 weeks status post from a left arthroscopic rotator cuff repair with open subpectoral biceps tenodesis.  Surgery was on 12/26/2023.  She is doing her therapy at [facility] feels that it is going well.  She is using the CPM machine and reports she is up to 120 already with forward elevation.  She has been compliant with wearing the UltraSling brace at all times.    The patient deny fevers, chills, numbness, tingling, and shortness of breath.    Medical History:  Patient's medications, allergies, past medical, surgical, social and family histories were reviewed and updated as appropriate.    No notes on file    Review of Systems  A 14 point review of systems was completed by the patient is available in the media section of the scanned medical record and was reviewed on 1/15/2024.      Vital Signs:  There were no vitals filed for this visit.    General/Appearance: Alert and oriented and in no apparent distress.    Skin:  There are no skin lesions, cellulitis, or extreme edema. The patient has warm and well-perfused Bilateral upper extremities with brisk capillary refill.      left Shoulder Exam:    Inspection: left shoulder incision that is clean, dry and intact and well approximated. The Prineo dressing is still in place.  Mild ecchymosis and swelling are present as can be expected. There is no erythema, drainage or other signs of infection    Palpation:  No crepitus to gentle motion    Active Range of Motion: Deferred    Passive Range of Motion: Forward elevation of 110 with a soft endpoint and external rotation of 20    Strength:  Deferred    Special Tests:  Deferred.    Neurovascular: Sensation to light touch is intact, no motor deficits, palpable radial pulses 2+    Radiology:     Plain radiographs not obtained today.         Assessment :  Ms. Margy Sands is a 53 y.o. patient who

## 2024-01-22 ENCOUNTER — TELEPHONE (OUTPATIENT)
Dept: ORTHOPEDIC SURGERY | Age: 54
End: 2024-01-22

## 2024-01-22 NOTE — TELEPHONE ENCOUNTER
General Question     Subject: CPM CHAIR  Patient and /or Facility Request: DEEPTI ALFARO  Contact Number: [phone]    PT CALLED STATING SHE SAW SUSHMA NAGAR LAST WEEK, SUSHMA GAVE HER NEW SETTINGS FOR HER CPM CHAIR BUT PATIENT HAS NOT BEEN ABLE TO GET IN CONTACT WITH A SERVICE REP TO HELP HER ADJUST THE SETTINGS ON HER CPM CHAIR. PT IS REQUESTING A CALL BACK.    PLEASE CALL PT BACK AT THE ABOVE NUMBER.

## 2024-02-06 ENCOUNTER — OFFICE VISIT (OUTPATIENT)
Dept: ORTHOPEDIC SURGERY | Age: 54
End: 2024-02-06

## 2024-02-06 VITALS — WEIGHT: 120 LBS | HEIGHT: 67 IN | BODY MASS INDEX: 18.83 KG/M2

## 2024-02-06 DIAGNOSIS — Z98.890 S/P ROTATOR CUFF REPAIR: Primary | ICD-10-CM

## 2024-02-06 PROCEDURE — 99024 POSTOP FOLLOW-UP VISIT: CPT | Performed by: ORTHOPAEDIC SURGERY

## 2024-02-06 NOTE — PROGRESS NOTES
[facility]  History and Physical  Shoulder Pain    Date:  2024    Name:  Margy Sands  Address:  [address] [place] [address] [place] [address]    :  1970      Age:   53 y.o.    SSN:  [id number]      Medical Record Number:  [id number]    Reason for Visit:    Shoulder Pain (F/U LEFT SHOULDER)      HPI:   Margy Sands is a 53 y.o. female who presents for a post operative visit.  The patient is 6 weeks status post from a left arthroscopic rotator cuff repair with open subpectoral biceps tenodesis.  Surgery was on 2023.  She is doing well, continues to do therapy at [facility] [place], operated by [facility].  Her shoulder pain is well controlled and her motion continues to improve.      Pain Assessment  Location of Pain: Shoulder  Location Modifiers: Left  Severity of Pain: 2  Limiting Behavior: Some  Relieving Factors: Rest, Exercise, Ice  Work-Related Injury: No  Are there other pain locations you wish to document?: No    No notes on file    Review of Systems:  A 14 point review of systems available in the scanned medical record as documented by the patient and reviewed on 2024.  The review is negative with the exception of those things mentioned in the History of Present Illness and Past Medical History.      Past Medical History:  Patient's medications, allergies, past medical, surgical, social and family histories were reviewed and updated as appropriate.    Allergies:  Allergies   Allergen Reactions    Amoxicillin Swelling and Angioedema     Throat swelling    Penicillins Swelling and Angioedema     Throat swelling       Physical Exam:  There were no vitals filed for this visit.  General: Margy Sands is a healthy and well appearing 53 y.o. female who is sitting comfortably in our office in acute distress.     Skin:  There are no skin lesions, cellulitis, or extreme edema. The patient has warm and well-perfused Bilateral upper extremities with brisk capillary refill.

## 2024-02-08 ENCOUNTER — TELEPHONE (OUTPATIENT)
Dept: ORTHOPEDIC SURGERY | Age: 54
End: 2024-02-08

## 2024-03-05 ENCOUNTER — OFFICE VISIT (OUTPATIENT)
Dept: ORTHOPEDIC SURGERY | Age: 54
End: 2024-03-05

## 2024-03-05 VITALS — BODY MASS INDEX: 18.83 KG/M2 | HEIGHT: 67 IN | WEIGHT: 120 LBS

## 2024-03-05 DIAGNOSIS — Z98.890 S/P ROTATOR CUFF REPAIR: Primary | ICD-10-CM

## 2024-03-05 PROCEDURE — 99024 POSTOP FOLLOW-UP VISIT: CPT | Performed by: ORTHOPAEDIC SURGERY

## 2024-03-05 NOTE — PROGRESS NOTES
verbal recognition program (DRAGON) and it was checked for errors.  It is possible that there are still dictated errors within this office note.  If so, please bring any errors to my attention for an addendum.  All efforts were made to ensure that this office note is accurate.  _______________  I was physically present and personally supervised the Orthopaedic Sports Medicine Fellow in the evaluation and development of a treatment plan for this patient. I personally interviewed the patient and performed a physical examination. In addition, I discussed the patient's condition and treatment options with them. I have also reviewed and agree with the past medical, family and social history unless otherwise noted. All of the patient's questions were answered.         João Baez MD, PhD  3/5/2024

## 2024-03-06 ENCOUNTER — TELEPHONE (OUTPATIENT)
Dept: ORTHOPEDIC SURGERY | Age: 54
End: 2024-03-06

## 2024-04-09 ENCOUNTER — OFFICE VISIT (OUTPATIENT)
Dept: ORTHOPEDIC SURGERY | Age: 54
End: 2024-04-09
Payer: COMMERCIAL

## 2024-04-09 VITALS — HEIGHT: 67 IN | WEIGHT: 120 LBS | BODY MASS INDEX: 18.83 KG/M2

## 2024-04-09 DIAGNOSIS — Z98.890 S/P LEFT ROTATOR CUFF REPAIR: Primary | ICD-10-CM

## 2024-04-09 PROCEDURE — 99213 OFFICE O/P EST LOW 20 MIN: CPT | Performed by: ORTHOPAEDIC SURGERY

## 2024-04-09 NOTE — PROGRESS NOTES
[facility]  History and Physical  Shoulder Pain    Date:  2024    Name:  Margy Sands  Address:  [address] [place] [address] [place] [address]    :  1970      Age:   53 y.o.    SSN:  [id number]      Medical Record Number:  [id number]    Reason for Visit:    Shoulder Pain (F/U LEFT SHOULDER)      HPI:   Margy Sands is a 53 y.o. female who presents to our office today for follow up of the left shoulder pain.  The patient is 4 months status post left arthroscopic rotator cuff repair with open subpectoral biceps tenodesis.      Surgery was on 2023.  She is doing well.     She had a previous RCR in  on contralateral side.     Pain Assessment  Location of Pain: Shoulder  Location Modifiers: Left  Severity of Pain: 1  Aggravating Factors: Stretching, Straightening  Limiting Behavior: Some  Relieving Factors: Rest, Exercise  Work-Related Injury: No  Are there other pain locations you wish to document?: No    No notes on file    Review of Systems:  A 14 point review of systems available in the scanned medical record as documented by the patient and reviewed on 2024.  The review is negative with the exception of those things mentioned in the History of Present Illness and Past Medical History.      Past Medical History:  Patient's medications, allergies, past medical, surgical, social and family histories were reviewed and updated as appropriate.    Allergies:  Allergies   Allergen Reactions    Amoxicillin Swelling and Angioedema     Throat swelling    Penicillins Swelling and Angioedema     Throat swelling       Physical Exam:  There were no vitals filed for this visit.  General: Margy Sands is a healthy and well appearing 53 y.o. female who is sitting comfortably in our office in acute distress.     Skin:  There are no skin lesions, cellulitis, or extreme edema. The patient has warm and well-perfused Bilateral upper extremities with brisk

## 2024-07-31 ENCOUNTER — TELEPHONE (OUTPATIENT)
Dept: FAMILY MEDICINE CLINIC | Age: 54
End: 2024-07-31

## 2024-07-31 DIAGNOSIS — R53.83 FATIGUE, UNSPECIFIED TYPE: ICD-10-CM

## 2024-07-31 DIAGNOSIS — Z00.00 WELL ADULT EXAM: Primary | ICD-10-CM

## 2024-07-31 NOTE — TELEPHONE ENCOUNTER
Pt called to schedule her yearly physical. It's scheduled for 10/14/24. She will be coming in for labs on 10/7. Please place pt's lab orders in her chart.

## 2024-09-03 ENCOUNTER — TELEPHONE (OUTPATIENT)
Dept: ORTHOPEDIC SURGERY | Age: 54
End: 2024-09-03

## 2024-09-04 ENCOUNTER — OFFICE VISIT (OUTPATIENT)
Dept: ORTHOPEDIC SURGERY | Age: 54
End: 2024-09-04
Payer: COMMERCIAL

## 2024-09-04 VITALS — BODY MASS INDEX: 18.83 KG/M2 | HEIGHT: 67 IN | WEIGHT: 120 LBS

## 2024-09-04 DIAGNOSIS — S46.011A ROTATOR CUFF STRAIN, RIGHT, INITIAL ENCOUNTER: ICD-10-CM

## 2024-09-04 DIAGNOSIS — Z98.890 S/P ARTHROSCOPY OF RIGHT SHOULDER: Primary | ICD-10-CM

## 2024-09-04 PROCEDURE — 99213 OFFICE O/P EST LOW 20 MIN: CPT | Performed by: STUDENT IN AN ORGANIZED HEALTH CARE EDUCATION/TRAINING PROGRAM

## 2024-09-04 NOTE — PROGRESS NOTES
[facility]  History and Physical  Shoulder Pain    Date:  2024    Name:  Margy Sands  Address:  [address] [place] [address] [place] [address]    :  1970      Age:   53 y.o.    SSN:  [id number]      Medical Record Number:  [id number]    Reason for Visit:    Shoulder Pain (RIGHT SHOULDER)      HPI:   Margy Sands is a 53 y.o. female who presents to our office today for follow up of right shoulder pain. She is known to our practice for having left RTCR on 2023 and right RCR on 2022. She said she was vacuuming on Saturday and had acute shoulder pain. She feels like since the injury the pain has been getting a little better and had done some running. She is concerned she may have injured her rotator cuff. She denies any RUE numbness or tingling. Before Saturday she feels like her right shoulder was doing well after surgeyr.      Pain Assessment  Location of Pain: Shoulder  Location Modifiers: Right  Severity of Pain: 1  Quality of Pain: Aching, Other (Comment)  Frequency of Pain: Intermittent  Aggravating Factors: Other (Comment), Straightening, Exercise, Stretching  Limiting Behavior: Some  Relieving Factors: Rest, Exercise  Work-Related Injury: No  Are there other pain locations you wish to document?: No    No notes on file    Review of Systems:  A 14 point review of systems available in the scanned medical record as documented by the patient and reviewed on 2024.  The review is negative with the exception of those things mentioned in the History of Present Illness and Past Medical History.      Past Medical History:  Patient's medications, allergies, past medical, surgical, social and family histories were reviewed and updated as appropriate.    Allergies:  Allergies   Allergen Reactions    Amoxicillin Swelling and Angioedema     Throat swelling    Penicillins Swelling and Angioedema     Throat swelling       Physical Exam:  There were no

## 2024-09-25 ENCOUNTER — HOSPITAL ENCOUNTER (OUTPATIENT)
Dept: MAMMOGRAPHY | Age: 54
Discharge: HOME | End: 2024-09-25
Payer: COMMERCIAL

## 2024-09-25 VITALS — HEIGHT: 68 IN | WEIGHT: 119 LBS | BODY MASS INDEX: 18.04 KG/M2

## 2024-09-25 DIAGNOSIS — Z12.31 VISIT FOR SCREENING MAMMOGRAM: ICD-10-CM

## 2024-09-25 PROCEDURE — 77063 BREAST TOMOSYNTHESIS BI: CPT

## 2024-10-07 ENCOUNTER — LAB (OUTPATIENT)
Dept: FAMILY MEDICINE CLINIC | Age: 54
End: 2024-10-07
Payer: COMMERCIAL

## 2024-10-07 DIAGNOSIS — Z00.00 WELL ADULT EXAM: ICD-10-CM

## 2024-10-07 DIAGNOSIS — R53.83 FATIGUE, UNSPECIFIED TYPE: ICD-10-CM

## 2024-10-07 LAB
25(OH)D3 SERPL-MCNC: 46 NG/ML
ALBUMIN SERPL-MCNC: 4.4 G/DL (ref 3.4–5)
ALBUMIN/GLOB SERPL: 2.6 {RATIO} (ref 1.1–2.2)
ALP SERPL-CCNC: 86 U/L (ref 40–129)
ALT SERPL-CCNC: 50 U/L (ref 10–40)
ANION GAP SERPL CALCULATED.3IONS-SCNC: 8 MMOL/L (ref 3–16)
AST SERPL-CCNC: 56 U/L (ref 15–37)
BASOPHILS # BLD: 0.1 K/UL (ref 0–0.2)
BASOPHILS NFR BLD: 2.9 %
BILIRUB SERPL-MCNC: 0.3 MG/DL (ref 0–1)
BUN SERPL-MCNC: 23 MG/DL (ref 7–20)
CALCIUM SERPL-MCNC: 9.4 MG/DL (ref 8.3–10.6)
CHLORIDE SERPL-SCNC: 107 MMOL/L (ref 99–110)
CHOLEST SERPL-MCNC: 146 MG/DL (ref 0–199)
CO2 SERPL-SCNC: 27 MMOL/L (ref 21–32)
CREAT SERPL-MCNC: 0.7 MG/DL (ref 0.6–1.1)
DEPRECATED RDW RBC AUTO: 13.9 % (ref 12.4–15.4)
EOSINOPHIL # BLD: 0.1 K/UL (ref 0–0.6)
EOSINOPHIL NFR BLD: 3 %
GFR SERPLBLD CREATININE-BSD FMLA CKD-EPI: >90 ML/MIN/{1.73_M2}
GLUCOSE SERPL-MCNC: 91 MG/DL (ref 70–99)
HCT VFR BLD AUTO: 37.6 % (ref 36–48)
HDLC SERPL-MCNC: 55 MG/DL (ref 40–60)
HGB BLD-MCNC: 13.1 G/DL (ref 12–16)
LDLC SERPL CALC-MCNC: 84 MG/DL
LYMPHOCYTES # BLD: 0.9 K/UL (ref 1–5.1)
LYMPHOCYTES NFR BLD: 37.3 %
MCH RBC QN AUTO: 33.3 PG (ref 26–34)
MCHC RBC AUTO-ENTMCNC: 35 G/DL (ref 31–36)
MCV RBC AUTO: 95.3 FL (ref 80–100)
MONOCYTES # BLD: 0.2 K/UL (ref 0–1.3)
MONOCYTES NFR BLD: 8.2 %
NEUTROPHILS # BLD: 1.2 K/UL (ref 1.7–7.7)
NEUTROPHILS NFR BLD: 48.6 %
PLATELET # BLD AUTO: 178 K/UL (ref 135–450)
PMV BLD AUTO: 9.3 FL (ref 5–10.5)
POTASSIUM SERPL-SCNC: 4.8 MMOL/L (ref 3.5–5.1)
PROT SERPL-MCNC: 6.1 G/DL (ref 6.4–8.2)
RBC # BLD AUTO: 3.94 M/UL (ref 4–5.2)
SODIUM SERPL-SCNC: 142 MMOL/L (ref 136–145)
TRIGL SERPL-MCNC: 36 MG/DL (ref 0–150)
TSH SERPL DL<=0.005 MIU/L-ACNC: 0.92 UIU/ML (ref 0.27–4.2)
VLDLC SERPL CALC-MCNC: 7 MG/DL
WBC # BLD AUTO: 2.4 K/UL (ref 4–11)

## 2024-10-07 PROCEDURE — 36415 COLL VENOUS BLD VENIPUNCTURE: CPT | Performed by: FAMILY MEDICINE

## 2024-10-07 NOTE — PROGRESS NOTES
Patient here for fasting blood work. Blood work drawn from right AC without complications    2sst  1lav

## 2024-10-11 ASSESSMENT — PATIENT HEALTH QUESTIONNAIRE - PHQ9
SUM OF ALL RESPONSES TO PHQ QUESTIONS 1-9: 0
1. LITTLE INTEREST OR PLEASURE IN DOING THINGS: NOT AT ALL
SUM OF ALL RESPONSES TO PHQ QUESTIONS 1-9: 0
SUM OF ALL RESPONSES TO PHQ QUESTIONS 1-9: 0
1. LITTLE INTEREST OR PLEASURE IN DOING THINGS: NOT AT ALL
SUM OF ALL RESPONSES TO PHQ QUESTIONS 1-9: 0
SUM OF ALL RESPONSES TO PHQ9 QUESTIONS 1 & 2: 0
2. FEELING DOWN, DEPRESSED OR HOPELESS: NOT AT ALL
2. FEELING DOWN, DEPRESSED OR HOPELESS: NOT AT ALL
SUM OF ALL RESPONSES TO PHQ9 QUESTIONS 1 & 2: 0

## 2024-10-14 ENCOUNTER — OFFICE VISIT (OUTPATIENT)
Dept: ORTHOPEDIC SURGERY | Age: 54
End: 2024-10-14
Payer: COMMERCIAL

## 2024-10-14 ENCOUNTER — TELEPHONE (OUTPATIENT)
Dept: ORTHOPEDIC SURGERY | Age: 54
End: 2024-10-14

## 2024-10-14 ENCOUNTER — OFFICE VISIT (OUTPATIENT)
Dept: FAMILY MEDICINE CLINIC | Age: 54
End: 2024-10-14
Payer: COMMERCIAL

## 2024-10-14 VITALS
WEIGHT: 122.2 LBS | SYSTOLIC BLOOD PRESSURE: 114 MMHG | HEIGHT: 68 IN | DIASTOLIC BLOOD PRESSURE: 80 MMHG | BODY MASS INDEX: 18.52 KG/M2

## 2024-10-14 VITALS — BODY MASS INDEX: 19.15 KG/M2 | WEIGHT: 122 LBS | HEIGHT: 67 IN

## 2024-10-14 DIAGNOSIS — M25.561 RIGHT KNEE PAIN, UNSPECIFIED CHRONICITY: Primary | ICD-10-CM

## 2024-10-14 DIAGNOSIS — Z00.00 WELL EXAM WITHOUT ABNORMAL FINDINGS OF PATIENT 18 YEARS OF AGE OR OLDER: Primary | ICD-10-CM

## 2024-10-14 DIAGNOSIS — R74.8 ELEVATED LIVER ENZYMES: ICD-10-CM

## 2024-10-14 DIAGNOSIS — S80.01XA CONTUSION OF RIGHT KNEE, INITIAL ENCOUNTER: ICD-10-CM

## 2024-10-14 DIAGNOSIS — D50.8 IRON DEFICIENCY ANEMIA SECONDARY TO INADEQUATE DIETARY IRON INTAKE: ICD-10-CM

## 2024-10-14 PROCEDURE — 99396 PREV VISIT EST AGE 40-64: CPT | Performed by: FAMILY MEDICINE

## 2024-10-14 PROCEDURE — 99214 OFFICE O/P EST MOD 30 MIN: CPT | Performed by: INTERNAL MEDICINE

## 2024-10-14 SDOH — ECONOMIC STABILITY: FOOD INSECURITY: WITHIN THE PAST 12 MONTHS, YOU WORRIED THAT YOUR FOOD WOULD RUN OUT BEFORE YOU GOT MONEY TO BUY MORE.: NEVER TRUE

## 2024-10-14 SDOH — ECONOMIC STABILITY: INCOME INSECURITY: HOW HARD IS IT FOR YOU TO PAY FOR THE VERY BASICS LIKE FOOD, HOUSING, MEDICAL CARE, AND HEATING?: NOT HARD AT ALL

## 2024-10-14 SDOH — ECONOMIC STABILITY: FOOD INSECURITY: WITHIN THE PAST 12 MONTHS, THE FOOD YOU BOUGHT JUST DIDN'T LAST AND YOU DIDN'T HAVE MONEY TO GET MORE.: NEVER TRUE

## 2024-10-14 ASSESSMENT — ENCOUNTER SYMPTOMS
GASTROINTESTINAL NEGATIVE: 1
RESPIRATORY NEGATIVE: 1

## 2024-10-14 NOTE — PROGRESS NOTES
Future  -     Luteinizing Hormone; Future  -     Follicle Stimulating Hormone; Future    Elevated liver enzymes    Iron deficiency anemia secondary to inadequate dietary iron intake      No follow-ups on file.                    LULU SOLIZ, DO

## 2024-10-14 NOTE — PROGRESS NOTES
Chief Complaint:   Chief Complaint   Patient presents with    Knee Pain     OP/NP TRIPPED AND FELL WHILE WALKING DOGS ON 10/2/24,  HIT KNEE ON PAVEMENT, MORE PAINFUL WHEN WALKING VS RUNNING, FEELS LIKE THERE MAY BE SWELLING          History of Present Illness:       Patient is a 53 y.o. female returns follow up for the above complaint. The patient presents with new complaint of right knee pain related to a fall sustained while walking her dogs 12 days ago.  The symptoms are improving but have not completely resolved.  She has been able to resume recreational running during this time without consequence.    The symptoms are more notable with walking as opposed to running    Pain localizes specifically to  the lateral ridge of the medial femoral condyle and  does not seems to follow a typical patella femoral provacative pattern.  There are not mechanical symptoms that suggest meniscal injury.  The patient denies subjective instability about the knee and denies new onset weakness of the lower extremity.    Pain level 7    The patient denies a pattern of activity related swelling.      Treatment to date: Voltaren TD    There is no prior history of knee trauma. Workup has included  none    There is no prior history of autoimmune disease, inflammatory arthropathy, or crystal arthropathy.              Past Medical History:        Past Medical History:   Diagnosis Date    Allergic     Anesthesia     in 2000 w/foor surgery had episode following surgery \"made to sit up too soon too fast passed out\"    Arthritis     Bone cyst 1990s    Spur in right shoulder    Bradycardia     Complication of anesthesia 1/2000    Blood pressure dropped when moved too quickly post-op    Left shoulder pain     Neuroma     Staph skin infection 10/2012    knee    Wears contact lenses     Wears contact lenses         Present Medications:         Current Outpatient Medications   Medication Sig Dispense Refill    aspirin 81 MG EC tablet Take 1

## 2024-10-16 ENCOUNTER — TELEPHONE (OUTPATIENT)
Dept: FAMILY MEDICINE CLINIC | Age: 54
End: 2024-10-16

## 2024-10-17 NOTE — PATIENT INSTRUCTIONS
[facility] Housing Resources*  (Call [employer]/211 if need more resources.)      [facility] 211   Speak to a trained professional 24/7 who can connect you to essential community services including food, clothing, transportation, housing, utilities, employment services, childcare, and baby supplies. 211 serves nationwide.  [facility].org for resources in [place], [facility], [facility] in [place]; [place], [place], [place], and [facility] in [place].   [facility] [facility].[email or web address]/resources for resources in [place], [place], [place], [place], [place], [place], [place], [place], [facility], [place], [place], and [facility] in [place].      Homeless Crisis Line  [facility] (CAP Line): Intake system for families and individuals who are currently experiencing homelessness or who are at risk of becoming homeless. Can provide list of shelters and bed availability  Phone Number: [phone] ([phone]) Contact this number first.  It is a centralized point of entry for services.  Hours of Operation:  Monday - Friday 9am - 5pm; Saturday & Sunday 10pm - 2pm    [facility]  What they offer: 24-hour facility providing medical and nursing care to sick persons experiencing homelessness; assistance in breaking the cycle of homelessness.  Contact: [email or web address]     Homeless Shelters: [facility] Women and Children  Located at [address] [facility] / Phone: [phone]  [facility]: Men   Located at [address] [facility] / Phone: [phone]  [facility]  Located at [address] / Phone: [phone]  [facility]  [address] [facility] / Phone: [phone]    Homeless Shelters: [facility]: Emergency shelter in hotels for a stay of one month. The agency also has rental assistance funds for security deposits and

## 2024-10-25 ENCOUNTER — TELEPHONE (OUTPATIENT)
Dept: ORTHOPEDIC SURGERY | Age: 54
End: 2024-10-25

## 2024-10-25 NOTE — TELEPHONE ENCOUNTER
Other PATIENT RETURNING CALL FROM PARISA. STATES THAT THERE HAS BEEN SOME IMPROVEMENT AND AT THIS TIME SHE WILL NOT BE NEEDING PT OR AN MRI.

## 2024-10-25 NOTE — TELEPHONE ENCOUNTER
CC to check status: LVM for pt to let us know how she is doing since the 10/14/24 OV.  If not improved, we can try either PT or MRI as next steps.  Requested call back or Rexter message to update her status.

## 2024-12-12 DIAGNOSIS — N95.1 PERIMENOPAUSE: Primary | ICD-10-CM

## 2024-12-13 ENCOUNTER — E-VISIT (OUTPATIENT)
Dept: FAMILY MEDICINE CLINIC | Age: 54
End: 2024-12-13

## 2024-12-13 ENCOUNTER — LAB (OUTPATIENT)
Dept: FAMILY MEDICINE CLINIC | Age: 54
End: 2024-12-13
Payer: COMMERCIAL

## 2024-12-13 DIAGNOSIS — S33.5XXS LUMBAR SPRAIN, SEQUELA: Primary | ICD-10-CM

## 2024-12-13 DIAGNOSIS — Z00.00 WELL ADULT EXAM: Primary | ICD-10-CM

## 2024-12-13 LAB
ALBUMIN SERPL-MCNC: 4.4 G/DL (ref 3.4–5)
ALBUMIN/GLOB SERPL: 2.2 {RATIO} (ref 1.1–2.2)
ALP SERPL-CCNC: 78 U/L (ref 40–129)
ALT SERPL-CCNC: 35 U/L (ref 10–40)
ANION GAP SERPL CALCULATED.3IONS-SCNC: 9 MMOL/L (ref 3–16)
AST SERPL-CCNC: 42 U/L (ref 15–37)
BASOPHILS # BLD: 0.1 K/UL (ref 0–0.2)
BASOPHILS NFR BLD: 2.7 %
BILIRUB SERPL-MCNC: <0.2 MG/DL (ref 0–1)
BUN SERPL-MCNC: 27 MG/DL (ref 7–20)
CALCIUM SERPL-MCNC: 9.7 MG/DL (ref 8.3–10.6)
CHLORIDE SERPL-SCNC: 108 MMOL/L (ref 99–110)
CO2 SERPL-SCNC: 25 MMOL/L (ref 21–32)
CREAT SERPL-MCNC: 0.7 MG/DL (ref 0.6–1.1)
DEPRECATED RDW RBC AUTO: 14.3 % (ref 12.4–15.4)
EOSINOPHIL # BLD: 0.1 K/UL (ref 0–0.6)
EOSINOPHIL NFR BLD: 2.1 %
FSH SERPL-ACNC: 98 MIU/ML
GFR SERPLBLD CREATININE-BSD FMLA CKD-EPI: >90 ML/MIN/{1.73_M2}
GLUCOSE SERPL-MCNC: 97 MG/DL (ref 70–99)
HCT VFR BLD AUTO: 43.1 % (ref 36–48)
HGB BLD-MCNC: 14.2 G/DL (ref 12–16)
LH SERPL-ACNC: 42.7 MIU/ML
LYMPHOCYTES # BLD: 0.8 K/UL (ref 1–5.1)
LYMPHOCYTES NFR BLD: 26.5 %
MCH RBC QN AUTO: 31.2 PG (ref 26–34)
MCHC RBC AUTO-ENTMCNC: 33 G/DL (ref 31–36)
MCV RBC AUTO: 94.6 FL (ref 80–100)
MONOCYTES # BLD: 0.2 K/UL (ref 0–1.3)
MONOCYTES NFR BLD: 5.8 %
NEUTROPHILS # BLD: 2 K/UL (ref 1.7–7.7)
NEUTROPHILS NFR BLD: 62.9 %
PLATELET # BLD AUTO: 155 K/UL (ref 135–450)
PMV BLD AUTO: 9.6 FL (ref 5–10.5)
POTASSIUM SERPL-SCNC: 5 MMOL/L (ref 3.5–5.1)
PROLACTIN SERPL IA-MCNC: 8.3 NG/ML
PROT SERPL-MCNC: 6.4 G/DL (ref 6.4–8.2)
RBC # BLD AUTO: 4.55 M/UL (ref 4–5.2)
SODIUM SERPL-SCNC: 142 MMOL/L (ref 136–145)
TSH SERPL DL<=0.005 MIU/L-ACNC: 1.37 UIU/ML (ref 0.27–4.2)
WBC # BLD AUTO: 3.2 K/UL (ref 4–11)

## 2024-12-13 PROCEDURE — 36415 COLL VENOUS BLD VENIPUNCTURE: CPT | Performed by: FAMILY MEDICINE

## 2024-12-13 RX ORDER — DICLOFENAC SODIUM 75 MG/1
75 TABLET, DELAYED RELEASE ORAL 2 TIMES DAILY
Qty: 20 TABLET | Refills: 0 | Status: SHIPPED | OUTPATIENT
Start: 2024-12-13

## 2024-12-13 NOTE — PROGRESS NOTES
Margy Sands (1970) initiated an asynchronous digital communication through [employer].    HPI: per patient questionnaire     Exam: not applicable    Diagnoses and all orders for this visit:  Diagnoses and all orders for this visit:    Lumbar sprain, sequela  -     tiZANidine (ZANAFLEX) 4 MG tablet; Take 1 tablet by mouth every 8 hours as needed (back spasm)  -     diclofenac (VOLTAREN) 75 MG EC tablet; Take 1 tablet by mouth 2 times daily          Time: EV2 - 11-20 minutes were spent on the digital evaluation and management of this patient.     LULU SOLIZ DO

## 2024-12-23 ENCOUNTER — NURSE ONLY (OUTPATIENT)
Dept: FAMILY MEDICINE CLINIC | Age: 54
End: 2024-12-23
Payer: COMMERCIAL

## 2024-12-23 PROCEDURE — 90471 IMMUNIZATION ADMIN: CPT | Performed by: FAMILY MEDICINE

## 2024-12-23 PROCEDURE — 90750 HZV VACC RECOMBINANT IM: CPT | Performed by: FAMILY MEDICINE

## 2024-12-23 NOTE — PROGRESS NOTES
Pt in for Shingles vaccine:  administered today- risks and benefits discussed.    Administered in Right deltoid Intramuscular.    tolerated the procedure well with no complications.

## 2025-02-08 SDOH — HEALTH STABILITY: PHYSICAL HEALTH: ON AVERAGE, HOW MANY DAYS PER WEEK DO YOU ENGAGE IN MODERATE TO STRENUOUS EXERCISE (LIKE A BRISK WALK)?: 7 DAYS

## 2025-02-08 SDOH — HEALTH STABILITY: PHYSICAL HEALTH: ON AVERAGE, HOW MANY MINUTES DO YOU ENGAGE IN EXERCISE AT THIS LEVEL?: 60 MIN

## 2025-02-11 ENCOUNTER — OFFICE VISIT (OUTPATIENT)
Dept: ORTHOPEDIC SURGERY | Age: 55
End: 2025-02-11

## 2025-02-11 VITALS — WEIGHT: 122 LBS | BODY MASS INDEX: 19.15 KG/M2 | HEIGHT: 67 IN

## 2025-02-11 DIAGNOSIS — M25.562 ACUTE PAIN OF LEFT KNEE: ICD-10-CM

## 2025-02-11 DIAGNOSIS — M25.561 RIGHT KNEE PAIN, UNSPECIFIED CHRONICITY: Primary | ICD-10-CM

## 2025-02-11 DIAGNOSIS — M17.0 OSTEOARTHRITIS OF PATELLOFEMORAL JOINTS, BILATERAL: ICD-10-CM

## 2025-02-11 RX ORDER — CELECOXIB 200 MG/1
200 CAPSULE ORAL DAILY
Qty: 30 CAPSULE | Refills: 1 | Status: SHIPPED | OUTPATIENT
Start: 2025-02-11

## 2025-02-11 RX ORDER — CELECOXIB 200 MG/1
200 CAPSULE ORAL DAILY
Qty: 30 CAPSULE | Refills: 1 | Status: SHIPPED | OUTPATIENT
Start: 2025-02-11 | End: 2025-02-11

## 2025-02-11 NOTE — PROGRESS NOTES
point review of systems negative except as mentioned in HPI       Vital Signs:    There were no vitals filed for this visit.     General Exam:     Constitutional: Patient is adequately groomed with no evidence of malnutrition  Mental Status: The patient is oriented to time, place and person.  The patient's mood and affect are appropriate.  Vascular: Examination reveals no swelling or calf tenderness.  Peripheral pulses are palpable and 2+.    Lymphatics: no lymphadenopathy of the inguinal region or lower extremity      Physical Exam: bilateral knee      Primary Exam:    Inspection: Positive J sign, no deformity or atrophy appreciable effusion      Palpation: Mild crepitation patellofemoral bilateral mild tenderness medial patella facet left greater than right      Range of Motion: 0/135      Strength: Normal with SLR      Special Tests:   Lachman test negative, collateral ligament stressing stable, anterior/posterior drawer negative, medial and lateral [facility] testing negative, patella femoral provacative Positive, negative patella apprehension negative patella tilt      Skin: There are no rashes, ulcerations or lesions.      Gait: Nonantalgic      Reflex intact lower     Additional Comments:        Additional Examinations:           Neurolgic -Light touch sensation and manual muscle testing normal L2-S1. No fasiculations. Pattella tendon and Achilles tendon reflexes +2 bilaterally.  Seated SLR negative           Office Imaging Results/Procedures PerformedToday:      Radiology:      X-rays obtained and reviewed in office:   Impression: bilateral knees. The tibiofemoral joints have a normal radiographic appearance and the joint spaces are preserved.  The patella is anatomic on the lateral projection and has a normal alignment on the merchant projection bilaterally.  There are no other soft tissue or osseous abnormalities.        Office Procedures:     Orders Placed This Encounter   Procedures    XR KNEE LEFT (MIN 4

## 2025-02-27 ENCOUNTER — OFFICE VISIT (OUTPATIENT)
Dept: ORTHOPEDIC SURGERY | Age: 55
End: 2025-02-27
Payer: COMMERCIAL

## 2025-02-27 VITALS — BODY MASS INDEX: 19.15 KG/M2 | HEIGHT: 67 IN | WEIGHT: 122 LBS

## 2025-02-27 DIAGNOSIS — M76.899 TENDINOSIS OF QUADRICEPS TENDON: Primary | ICD-10-CM

## 2025-02-27 DIAGNOSIS — M22.42 CHONDROMALACIA OF LEFT PATELLA: ICD-10-CM

## 2025-02-27 DIAGNOSIS — M22.41 CHONDROMALACIA OF RIGHT PATELLA: ICD-10-CM

## 2025-02-27 PROCEDURE — 99214 OFFICE O/P EST MOD 30 MIN: CPT | Performed by: INTERNAL MEDICINE

## 2025-02-27 RX ORDER — DEXAMETHASONE SODIUM PHOSPHATE 4 MG/ML
INJECTION, SOLUTION INTRA-ARTICULAR; INTRALESIONAL; INTRAMUSCULAR; INTRAVENOUS; SOFT TISSUE
Qty: 30 ML | Refills: 1 | Status: SHIPPED | OUTPATIENT
Start: 2025-02-27

## 2025-02-27 NOTE — PROGRESS NOTES
Chief Complaint:   Chief Complaint   Patient presents with    Knee Pain     Bilateral Knees - MRI results today. She is having a lot of pain today. Not sure if the Celebrex is helping.          History of Present Illness:       Patient is a 54 y.o. female returns follow up for the above complaint. The patient was last seen approximately 2 weeksago. The symptoms show no change since the last visit. The patient has had further testing for the problem.      MRI completed in the interim    Symptoms remain problematic.  More prominent on the left and right and seems to localize to the quadriceps insertion    Pain level 8/10 at worst predictably aggravated by navigating stairs    No appreciable change with trial of Celebrex she continues with PT    She would like to consider other treatment options     Past Medical History:        Past Medical History:   Diagnosis Date    Allergic     Anesthesia     in 2000 w/foor surgery had episode following surgery \"made to sit up too soon too fast passed out\"    Arthritis     Bone cyst 1990s    Spur in right shoulder    Bradycardia     Complication of anesthesia 1/2000    Blood pressure dropped when moved too quickly post-op    Left shoulder pain     Neuroma     Staph skin infection 10/2012    knee    Wears contact lenses     Wears contact lenses         Present Medications:         Current Outpatient Medications   Medication Sig Dispense Refill    dexAMETHasone (DECADRON) 4 MG/ML injection Apply 1 to 2 mL as iontophoresis in physical therapy 30 mL 1    Magnesium Carbonate, Antacid, (MAGNESIUM CARBONATE PO) Magnesium      Glucosamine-Chondroit-Vit C-Mn (GLUCOSAMINE CHONDR 1500 COMPLX PO)       VITAMIN D PO Vitamin D      celecoxib (CELEBREX) 200 MG capsule Take 1 capsule by mouth daily For 2 weeks then daily as needed thereafter 30 capsule 1    tiZANidine (ZANAFLEX) 4 MG tablet Take 1 tablet by mouth every 8 hours as needed (back spasm) 15 tablet 0    diclofenac (VOLTAREN) 75 MG EC

## 2025-04-04 ENCOUNTER — CLINICAL SUPPORT (OUTPATIENT)
Dept: FAMILY MEDICINE CLINIC | Age: 55
End: 2025-04-04
Payer: COMMERCIAL

## 2025-04-04 PROCEDURE — 90750 HZV VACC RECOMBINANT IM: CPT | Performed by: FAMILY MEDICINE

## 2025-04-04 PROCEDURE — 90471 IMMUNIZATION ADMIN: CPT | Performed by: FAMILY MEDICINE

## 2025-06-17 RX ORDER — CELECOXIB 200 MG/1
200 CAPSULE ORAL DAILY
Qty: 30 CAPSULE | Refills: 0 | Status: SHIPPED | OUTPATIENT
Start: 2025-06-17 | End: 2025-06-17

## 2025-06-17 RX ORDER — CELECOXIB 200 MG/1
200 CAPSULE ORAL DAILY PRN
Qty: 30 CAPSULE | Refills: 0 | Status: SHIPPED | OUTPATIENT
Start: 2025-06-17

## 2025-06-17 NOTE — TELEPHONE ENCOUNTER
Rx Request: Celebrex    Last Filled: 02/11/2025 with 1 refill  Refill Due: 04/11/2025  Last OV: 02/7/2025  Follow Up:  None

## 2025-06-19 ENCOUNTER — OFFICE VISIT (OUTPATIENT)
Dept: ORTHOPEDIC SURGERY | Age: 55
End: 2025-06-19
Payer: COMMERCIAL

## 2025-06-19 VITALS — BODY MASS INDEX: 19.15 KG/M2 | HEIGHT: 67 IN | WEIGHT: 122 LBS

## 2025-06-19 DIAGNOSIS — M25.461 EFFUSION OF RIGHT KNEE: ICD-10-CM

## 2025-06-19 DIAGNOSIS — S83.411A SPRAIN OF MEDIAL COLLATERAL LIGAMENT OF RIGHT KNEE, INITIAL ENCOUNTER: ICD-10-CM

## 2025-06-19 DIAGNOSIS — M17.10 OSTEOARTHRITIS OF PATELLOFEMORAL JOINT, UNSPECIFIED LATERALITY: Primary | ICD-10-CM

## 2025-06-19 PROCEDURE — L1812 KO ELASTIC W/JOINTS PRE OTS: HCPCS | Performed by: INTERNAL MEDICINE

## 2025-06-19 PROCEDURE — 99214 OFFICE O/P EST MOD 30 MIN: CPT | Performed by: INTERNAL MEDICINE

## 2025-06-19 RX ORDER — METHYLPREDNISOLONE 4 MG/1
TABLET ORAL
Qty: 1 KIT | Refills: 0 | Status: SHIPPED | OUTPATIENT
Start: 2025-06-19

## 2025-06-19 NOTE — PROGRESS NOTES
Chief Complaint:   Chief Complaint   Patient presents with    Knee Pain     Right Knee - Was on vacation and climbed a lot of dunes. Knee was swollen twice the size. It has since gone down. She hasn't really had any pain, just stiffness.          History of Present Illness:       Patient is a 54 y.o. female presents with the above complaint. The symptoms began 10 daysago and started without an injury but directly correlates with hiking at the [employer] The patient describes a right knee knee swelling than pain that does not radiate.  The symptoms are constant  and are show no change since the onset.      Pain localizes to the femoral side medial  knee and does not seem to follow a typical patellofemoral provacative pattern.  There are not mechanical symptoms that suggest meniscal injury.  The patient denies subjective instability about the knee and denies new onset weakness of the lower extremity.    Pain level 4    The patient denies a pattern of activity related swelling.      Treatment to date: NSAIDS Celebrex with no improvement.     There is no prior history of knee trauma. Workup has included none    There is no prior history of autoimmune disease, inflammatory arthropathy, or crystal arthropathy.          Past Medical History:        Past Medical History:   Diagnosis Date    Allergic     Anesthesia     in 2000 w/foor surgery had episode following surgery \"made to sit up too soon too fast passed out\"    Arthritis     Bone cyst 1990s    Spur in right shoulder    Bradycardia     Complication of anesthesia 1/2000    Blood pressure dropped when moved too quickly post-op    Left shoulder pain     Neuroma     Staph skin infection 10/2012    knee    Wears contact lenses     Wears contact lenses             Present Medications:         Current Outpatient Medications   Medication Sig Dispense Refill    methylPREDNISolone (MEDROL, AG,) 4 MG tablet By mouth. 1 kit 0    celecoxib (CELEBREX) 200 MG capsule Take 1

## 2025-07-16 RX ORDER — CELECOXIB 200 MG/1
200 CAPSULE ORAL DAILY PRN
Qty: 30 CAPSULE | Refills: 0 | Status: SHIPPED | OUTPATIENT
Start: 2025-07-16

## 2025-07-16 NOTE — TELEPHONE ENCOUNTER
Rx Request: Celebrex    Last Filled: 06/17/2025  Refill Due: 07/17/2025  Last OV: 06/19/2025  Follow Up: None    External PT

## 2025-08-12 ENCOUNTER — OFFICE VISIT (OUTPATIENT)
Dept: ORTHOPEDIC SURGERY | Age: 55
End: 2025-08-12

## 2025-08-12 DIAGNOSIS — M22.41 CHONDROMALACIA OF RIGHT PATELLA: ICD-10-CM

## 2025-08-12 DIAGNOSIS — M17.10 OSTEOARTHRITIS OF PATELLOFEMORAL JOINT, UNSPECIFIED LATERALITY: Primary | ICD-10-CM

## 2025-08-14 ENCOUNTER — TELEPHONE (OUTPATIENT)
Dept: FAMILY MEDICINE CLINIC | Age: 55
End: 2025-08-14

## 2025-08-14 DIAGNOSIS — R53.83 FATIGUE, UNSPECIFIED TYPE: ICD-10-CM

## 2025-08-14 DIAGNOSIS — Z00.00 WELL ADULT EXAM: Primary | ICD-10-CM

## 2025-08-22 ENCOUNTER — PATIENT MESSAGE (OUTPATIENT)
Dept: ORTHOPEDIC SURGERY | Age: 55
End: 2025-08-22

## (undated) DEVICE — SUTURE ABSORBABLE MONOFILAMENT 1-0 CT1 27 IN VIO PDS + PDP341H

## (undated) DEVICE — SOLUTION IV IRRIG LACTATED RINGERS 3000ML 2B7487

## (undated) DEVICE — SYSTEM SKIN CLSR 22CM DERMBND PRINEO

## (undated) DEVICE — SHOULDER ARTHROSCOPY: Brand: MEDLINE INDUSTRIES, INC.

## (undated) DEVICE — CANNULA ARTHSCP L2CM DIA8MM PASSPRT BTTN

## (undated) DEVICE — 3M™ STERI-DRAPE™ U-DRAPE 1067 1067 5/BX 4BX/CS/CTN&#X20;: Brand: STERI-DRAPE™

## (undated) DEVICE — SUTURE VCRL SZ 3-0 L27IN ABSRB UD L26MM CT-2 1/2 CIR J232H

## (undated) DEVICE — BUR SHAVER 5 MMX13 CM 8 FLUT OVL FOR AGGRESSIVE BNE COOLCUT

## (undated) DEVICE — PROBE ABLAT XL 90DEG ASPIR BPLR RF 1 PC ELECTRD ERGO HNDL

## (undated) DEVICE — 3M™ IOBAN™ 2 ANTIMICROBIAL INCISE DRAPE 6640EZ: Brand: IOBAN™ 2

## (undated) DEVICE — CANNULA ARTHSCP L7CM DIA7MM TRNSLUC THRD FLX W/ NO SQUIRT

## (undated) DEVICE — SUTURE FIBERTAPE FIBERWIRE SZ 2-0 30IN NONABSORB BLU AR72377

## (undated) DEVICE — ADHESIVE SKIN CLOSURE WND 8.661X1.5 IN 22 CM LIQUIBAND SECUR

## (undated) DEVICE — TUBING FLD MGMT Y DBL SPIK DUALWAVE

## (undated) DEVICE — NEEDLE SUT PASS FOR ARTHSCP SCORPION

## (undated) DEVICE — BLADE SHV L13CM DIA5MM EXCALIBUR AGG COOLCUT

## (undated) DEVICE — GOWN,SIRUS,POLYRNF,BRTHSLV,XL,30/CS: Brand: MEDLINE

## (undated) DEVICE — 4-PORT MANIFOLD: Brand: NEPTUNE 2

## (undated) DEVICE — UNDERGLOVE SURG SZ 8 BLU LTX FREE SYN POLYISOPRENE POLYMER

## (undated) DEVICE — TUBING PMP L16FT MAIN DISP FOR AR-6400 AR-6475

## (undated) DEVICE — GLOVE ORANGE PI 8   MSG9080

## (undated) DEVICE — TOWEL,STOP FLAG GOLD N-W: Brand: MEDLINE

## (undated) DEVICE — GOWN,REINFRCE,POLY,ECLIPSE,SLV,3XLG: Brand: MEDLINE

## (undated) DEVICE — SUTURE FIBERWIRE SZ 2 W/ TAPERED NEEDLE BLUE L38IN NONABSORB BLU L26.5MM 1/2 CIRCLE AR7200

## (undated) DEVICE — PUDDLEVAC FLOOR SUCTION DEVICE: Brand: PUDDLEVAC

## (undated) DEVICE — SUTURE PDS II SZ 1 L27IN ABSRB VLT CT-1 L36MM 1/2 CIR Z341H

## (undated) DEVICE — PAD DRY FLOOR ABS 32X58IN GRN

## (undated) DEVICE — GLOVE SURG SZ 8 L12IN FNGR THK75MIL WHT LTX POLYMER BEAD

## (undated) DEVICE — SUTURE VCRL + SZ 3-0 L27IN ABSRB UD CT-2 L26MM 1/2 CIR TAPR VCP232H

## (undated) DEVICE — SUTURE MCRYL SZ 4-0 L27IN ABSRB UD L19MM PS-2 1/2 CIR PRIM Y426H

## (undated) DEVICE — SUTURE MCRYL + SZ 4-0 L27IN ABSRB UD L19MM PS-2 3/8 CIR MCP426H

## (undated) DEVICE — NEEDLE SUT PASS FOR ROT CUF LABRAL REP MULTFI SCORPION

## (undated) DEVICE — MAT FLR W32XL58IN

## (undated) DEVICE — SHOULDER STABILIZATION KIT,                                    DISPOSABLE 12 PER BOX

## (undated) DEVICE — SPONGE GZ W4XL8IN COT WVN 12 PLY

## (undated) DEVICE — TUBING PMP L6FT CONT WAVE EXTN